# Patient Record
Sex: FEMALE | Race: OTHER | Employment: FULL TIME | URBAN - METROPOLITAN AREA
[De-identification: names, ages, dates, MRNs, and addresses within clinical notes are randomized per-mention and may not be internally consistent; named-entity substitution may affect disease eponyms.]

---

## 2019-11-07 ENCOUNTER — OFFICE VISIT (OUTPATIENT)
Dept: INTERNAL MEDICINE CLINIC | Facility: CLINIC | Age: 49
End: 2019-11-07
Payer: COMMERCIAL

## 2019-11-07 VITALS
HEART RATE: 83 BPM | HEIGHT: 63 IN | OXYGEN SATURATION: 98 % | DIASTOLIC BLOOD PRESSURE: 87 MMHG | SYSTOLIC BLOOD PRESSURE: 145 MMHG | WEIGHT: 153 LBS | BODY MASS INDEX: 27.11 KG/M2

## 2019-11-07 DIAGNOSIS — R03.0 ELEVATED BLOOD PRESSURE READING: ICD-10-CM

## 2019-11-07 DIAGNOSIS — D23.39 DERMOID CYST OF FOREHEAD: ICD-10-CM

## 2019-11-07 DIAGNOSIS — Z12.4 SCREENING FOR CERVICAL CANCER: ICD-10-CM

## 2019-11-07 DIAGNOSIS — R22.1 LUMP IN NECK: ICD-10-CM

## 2019-11-07 DIAGNOSIS — R21 RASH AND NONSPECIFIC SKIN ERUPTION: ICD-10-CM

## 2019-11-07 DIAGNOSIS — Z12.31 BREAST CANCER SCREENING BY MAMMOGRAM: ICD-10-CM

## 2019-11-07 DIAGNOSIS — Z12.11 SCREEN FOR COLON CANCER: Primary | ICD-10-CM

## 2019-11-07 PROCEDURE — 99204 OFFICE O/P NEW MOD 45 MIN: CPT | Performed by: INTERNAL MEDICINE

## 2019-11-07 NOTE — PROGRESS NOTES
Brandi Cazares is a 44year old female. Patient presents with:  Rash: on R-outter thigh  Bump: on forehead  Swelling: R-clavicle    HPI:   57-year-old female here to reestablish care.    She reports that she noticed a bump on her forehead few months davion Constitutional: Negative for activity change, appetite change and fever. HENT: Negative for congestion and voice change. Respiratory: Negative for cough and shortness of breath. Cardiovascular: Negative for chest pain.    Gastrointestinal: Negativ HEAD/NECK (CVY=[id number]); Future    6. Elevated blood pressure reading  Discussed about pharmacotherapy. She wants to wait. Discussed low-salt diet. Encourage patient to check blood pressure at home.     7. Rash and nonspecific skin eruption  Circumscribed

## 2019-11-08 ENCOUNTER — TELEPHONE (OUTPATIENT)
Dept: INTERNAL MEDICINE CLINIC | Facility: CLINIC | Age: 49
End: 2019-11-08

## 2019-11-08 NOTE — TELEPHONE ENCOUNTER
Spoke with Davie Tian at [facility] receipt of Econazole Rx sent today, but reports 85g = 7 tubes of medication. Davie Tian asking if Dr. Vladislav Moses would like Rx for 15 g tube with add'l refills.     Med re-pended for 15 g + 5     Please advise pr

## 2019-11-08 NOTE — TELEPHONE ENCOUNTER
Spoke with patient and relayed Dr. Lazaro Puente message below--patient verbalizes understanding and agreement. No further questions/concerns at this time.

## 2019-11-08 NOTE — TELEPHONE ENCOUNTER
Patient states the pharmacy advised her the medication below is no longer available and requesting an alternative medication. •  Clotrimazole 1 % External Ointment, Apply 1 Application topically 2 (two) times daily. , Disp: 56.7 g, Rfl: 2

## 2019-11-20 ENCOUNTER — OFFICE VISIT (OUTPATIENT)
Dept: OBGYN CLINIC | Facility: CLINIC | Age: 49
End: 2019-11-20
Payer: COMMERCIAL

## 2019-11-20 VITALS
HEART RATE: 87 BPM | DIASTOLIC BLOOD PRESSURE: 83 MMHG | SYSTOLIC BLOOD PRESSURE: 142 MMHG | BODY MASS INDEX: 27 KG/M2 | WEIGHT: 154.63 LBS

## 2019-11-20 DIAGNOSIS — Z01.419 WELL WOMAN EXAM WITH ROUTINE GYNECOLOGICAL EXAM: Primary | ICD-10-CM

## 2019-11-20 PROCEDURE — 99386 PREV VISIT NEW AGE 40-64: CPT | Performed by: CLINICAL NURSE SPECIALIST

## 2019-11-20 NOTE — PROGRESS NOTES
Kameron Wills is a 44year old female [id number] Patient's last menstrual period was 09/13/2019. Patient presents with:  Gyn Exam: ANNUAL EXAM   Last annual exam and pap was 2013. Pap was normal, HPV negative. Denies hx of abnormal pap's.  Periods are ir Years: 15.00        Pack years: 7.5        Types: Cigarettes        Quit date: 9/26/2016        Years since quitting: 3.1      Smokeless tobacco: Never Used      Tobacco comment: 1 pack in a week    Substance and Sexual Activity      Alcohol use:  No Narrative      Not on file      FAMILY HISTORY:  Family History   Problem Relation Age of Onset   • Hypertension Father    • Heart Disease Mother         CAD   • Hypertension Mother        MEDICATIONS:    Current Outpatient Medications:   •  Econazole Nitr situation.  Appropriate mood and affect    Pelvic Exam:  External Genitalia: normal appearance, hair distribution, and no lesions  Urethral Meatus:  normal in size, location, without lesions and prolapse  Bladder:  No fullness, masses or tenderness  Vagina:

## 2019-11-21 ENCOUNTER — OFFICE VISIT (OUTPATIENT)
Dept: SURGERY | Facility: CLINIC | Age: 49
End: 2019-11-21
Payer: COMMERCIAL

## 2019-11-21 VITALS — BODY MASS INDEX: 27.29 KG/M2 | HEIGHT: 63 IN | WEIGHT: 154 LBS

## 2019-11-21 DIAGNOSIS — L72.0 EPIDERMAL CYST: Primary | ICD-10-CM

## 2019-11-21 PROCEDURE — 99242 OFF/OP CONSLTJ NEW/EST SF 20: CPT | Performed by: SURGERY

## 2019-11-21 NOTE — H&P
Surgical Consultation    Chief complaint: Patient presents with:  Cyst: Pt has a lump on her forehead that has been present for a few months. Denies pain. HPI: Delroy Augustin presents for consultation and evaluation of a mass that developed on her forehead. standard drinks      Drug use: No      Sexual activity: Yes        Partners: Male       Family history:  Family History   Problem Relation Age of Onset   • Hypertension Father    • Heart Disease Mother         CAD   • Hypertension Mother         Review of appropriate for age      Assessment and plan:  Diagnoses and all orders for this visit:    Epidermal cyst       The differential diagnosis was discussed, the surgical approaches to excision were discussed, the risks and benefits and alternatives were discu

## 2019-11-27 ENCOUNTER — OFFICE VISIT (OUTPATIENT)
Dept: SURGERY | Facility: CLINIC | Age: 49
End: 2019-11-27
Payer: COMMERCIAL

## 2019-11-27 VITALS — WEIGHT: 154 LBS | BODY MASS INDEX: 27.29 KG/M2 | HEIGHT: 63 IN

## 2019-11-27 DIAGNOSIS — L72.0 EPIDERMAL CYST: Primary | ICD-10-CM

## 2019-11-27 PROCEDURE — 11443 EXC FACE-MM B9+MARG 2.1-3 CM: CPT | Performed by: SURGERY

## 2019-11-27 PROCEDURE — 12051 INTMD RPR FACE/MM 2.5 CM/<: CPT | Performed by: SURGERY

## 2019-11-27 NOTE — PROCEDURES
Procedure Note  The risks, benefits, alternatives and expected recovery were explained. The pt expressed understanding and wished to proceed with the procedure.       Preop:  Enlarging subcutaneous mass of the forehead  Postop:  Same  Procedure: Excision o

## 2019-12-04 ENCOUNTER — HOSPITAL ENCOUNTER (OUTPATIENT)
Dept: ULTRASOUND IMAGING | Age: 49
Discharge: HOME | End: 2019-12-04
Attending: INTERNAL MEDICINE
Payer: COMMERCIAL

## 2019-12-04 DIAGNOSIS — R22.1 LUMP IN NECK: ICD-10-CM

## 2019-12-04 PROCEDURE — 76536 US EXAM OF HEAD AND NECK: CPT | Performed by: INTERNAL MEDICINE

## 2019-12-05 ENCOUNTER — APPOINTMENT (OUTPATIENT)
Dept: LAB | Facility: HOSPITAL | Age: 49
End: 2019-12-05
Attending: INTERNAL MEDICINE
Payer: COMMERCIAL

## 2019-12-05 ENCOUNTER — OFFICE VISIT (OUTPATIENT)
Dept: INTERNAL MEDICINE CLINIC | Facility: CLINIC | Age: 49
End: 2019-12-05
Payer: COMMERCIAL

## 2019-12-05 VITALS
BODY MASS INDEX: 27.29 KG/M2 | SYSTOLIC BLOOD PRESSURE: 126 MMHG | HEIGHT: 63 IN | WEIGHT: 154 LBS | HEART RATE: 66 BPM | DIASTOLIC BLOOD PRESSURE: 85 MMHG

## 2019-12-05 DIAGNOSIS — Z00.00 ADULT GENERAL MEDICAL EXAM: Primary | ICD-10-CM

## 2019-12-05 DIAGNOSIS — Z00.00 ADULT GENERAL MEDICAL EXAM: ICD-10-CM

## 2019-12-05 PROCEDURE — 80053 COMPREHEN METABOLIC PANEL: CPT

## 2019-12-05 PROCEDURE — 80061 LIPID PANEL: CPT

## 2019-12-05 PROCEDURE — 81001 URINALYSIS AUTO W/SCOPE: CPT

## 2019-12-05 PROCEDURE — 36415 COLL VENOUS BLD VENIPUNCTURE: CPT

## 2019-12-05 PROCEDURE — 83036 HEMOGLOBIN GLYCOSYLATED A1C: CPT

## 2019-12-05 PROCEDURE — 99396 PREV VISIT EST AGE 40-64: CPT | Performed by: INTERNAL MEDICINE

## 2019-12-05 PROCEDURE — 85027 COMPLETE CBC AUTOMATED: CPT

## 2019-12-05 PROCEDURE — 84443 ASSAY THYROID STIM HORMONE: CPT

## 2019-12-05 NOTE — PROGRESS NOTES
Emily Moralez is a 44year old female. Patient presents with:  Test Results: had US done, pt fasting for labs    HPI:   49-year-old female here for physical.  She has no complaints. The lump in her forehead has been removed.     Past medical history n reaction(s): METHYLPREDNISOLONE     REVIEW OF SYSTEMS:     Review of Systems   Constitutional: Negative for appetite change, fever and unexpected weight change. HENT: Negative for congestion, ear pain, nosebleeds and sore throat.     Eyes: Negative for pa cranial nerve deficit, sensory deficit or motor deficit. Coordination normal.   Skin: Skin is warm and dry. Psychiatric: She has a normal mood and affect. Her behavior is normal. Judgment normal.         ASSESSMENT AND PLAN:   1.  Adult general medical ex

## 2019-12-06 ENCOUNTER — OFFICE VISIT (OUTPATIENT)
Dept: SURGERY | Facility: CLINIC | Age: 49
End: 2019-12-06
Payer: COMMERCIAL

## 2019-12-06 ENCOUNTER — HOSPITAL ENCOUNTER (OUTPATIENT)
Dept: MAMMOGRAPHY | Age: 49
Discharge: HOME | End: 2019-12-06
Attending: INTERNAL MEDICINE
Payer: COMMERCIAL

## 2019-12-06 VITALS — HEIGHT: 63 IN | WEIGHT: 154 LBS | BODY MASS INDEX: 27.29 KG/M2

## 2019-12-06 DIAGNOSIS — Z98.890 POST-OPERATIVE STATE: Primary | ICD-10-CM

## 2019-12-06 DIAGNOSIS — D17.9 LIPOMA, UNSPECIFIED SITE: Primary | ICD-10-CM

## 2019-12-06 DIAGNOSIS — Z12.31 BREAST CANCER SCREENING BY MAMMOGRAM: ICD-10-CM

## 2019-12-06 PROCEDURE — 99024 POSTOP FOLLOW-UP VISIT: CPT | Performed by: SURGERY

## 2019-12-06 PROCEDURE — 77063 BREAST TOMOSYNTHESIS BI: CPT | Performed by: INTERNAL MEDICINE

## 2019-12-06 PROCEDURE — 77067 SCR MAMMO BI INCL CAD: CPT | Performed by: INTERNAL MEDICINE

## 2019-12-06 NOTE — PROGRESS NOTES
S:  Catarino Tsang is a 44year old female sp excsn hemangioma  Doing well, no fevers, no chills, tolerating a general diet, generally normal bowel movements, no calf tenderness nor lower extremity edema, no shortness of breath, no chest pain.      We

## 2019-12-10 ENCOUNTER — TELEPHONE (OUTPATIENT)
Dept: INTERNAL MEDICINE CLINIC | Facility: CLINIC | Age: 49
End: 2019-12-10

## 2019-12-10 NOTE — TELEPHONE ENCOUNTER
Patient calling, received call yesterday about mammogram results, states she is bit confused with the outcome of that call ( see result notes )   Patient informed she needs to have an US of pascual breasts and should have been contacted by Mammogram dept.    Pa

## 2019-12-26 ENCOUNTER — HOSPITAL ENCOUNTER (OUTPATIENT)
Dept: ULTRASOUND IMAGING | Facility: HOSPITAL | Age: 49
Discharge: HOME | End: 2019-12-26
Attending: INTERNAL MEDICINE
Payer: COMMERCIAL

## 2019-12-26 DIAGNOSIS — R92.8 ABNORMAL MAMMOGRAM: ICD-10-CM

## 2019-12-26 PROCEDURE — 76642 ULTRASOUND BREAST LIMITED: CPT | Performed by: INTERNAL MEDICINE

## 2020-07-13 ENCOUNTER — OFFICE VISIT (OUTPATIENT)
Dept: INTERNAL MEDICINE CLINIC | Facility: CLINIC | Age: 50
End: 2020-07-13
Payer: COMMERCIAL

## 2020-07-13 VITALS
TEMPERATURE: 99 F | SYSTOLIC BLOOD PRESSURE: 137 MMHG | BODY MASS INDEX: 28 KG/M2 | HEIGHT: 63 IN | HEART RATE: 77 BPM | DIASTOLIC BLOOD PRESSURE: 88 MMHG | WEIGHT: 158 LBS

## 2020-07-13 DIAGNOSIS — R04.0 EPISTAXIS: Primary | ICD-10-CM

## 2020-07-13 DIAGNOSIS — Z12.11 SCREEN FOR COLON CANCER: ICD-10-CM

## 2020-07-13 DIAGNOSIS — D17.0 LIPOMA OF NECK: ICD-10-CM

## 2020-07-13 DIAGNOSIS — H53.8 BLURRY VISION, BILATERAL: ICD-10-CM

## 2020-07-13 PROCEDURE — 99214 OFFICE O/P EST MOD 30 MIN: CPT | Performed by: INTERNAL MEDICINE

## 2020-07-14 NOTE — PROGRESS NOTES
Loco Orellana is a 44year old female. Patient presents with:  Bump: on forehead, was told it was a lipoma  Epistaxis: after hot shower or when bending down, bleeds from R-nostril    HPI:   53-year-old female here for follow-up.   She reports that for a week    Alcohol use: No      Alcohol/week: 0.0 standard drinks    Drug use: No     Family History   Problem Relation Age of Onset   • Hypertension Father    • Heart Disease Mother         CAD   • Hypertension Mother         Methylprednisolone          Co INTERNAL    2. Lipoma of neck  I have reviewed the ultrasound neck report. Provided with a referral for surgery. She also needs reevaluation of previous excision site/recurrence  - SURGERY - INTERNAL    3.  Blurry vision, bilateral  We will refer ophthalm

## 2020-07-22 ENCOUNTER — TELEPHONE (OUTPATIENT)
Dept: OPHTHALMOLOGY | Facility: CLINIC | Age: 50
End: 2020-07-22

## 2020-07-28 ENCOUNTER — LAB ENCOUNTER (OUTPATIENT)
Dept: LAB | Facility: HOSPITAL | Age: 50
End: 2020-07-28
Attending: INTERNAL MEDICINE
Payer: COMMERCIAL

## 2020-07-28 ENCOUNTER — OFFICE VISIT (OUTPATIENT)
Dept: OTOLARYNGOLOGY | Facility: CLINIC | Age: 50
End: 2020-07-28
Payer: COMMERCIAL

## 2020-07-28 VITALS
SYSTOLIC BLOOD PRESSURE: 136 MMHG | HEIGHT: 63 IN | DIASTOLIC BLOOD PRESSURE: 85 MMHG | HEART RATE: 81 BPM | WEIGHT: 158 LBS | BODY MASS INDEX: 28 KG/M2

## 2020-07-28 DIAGNOSIS — Z00.00 ADULT GENERAL MEDICAL EXAM: ICD-10-CM

## 2020-07-28 DIAGNOSIS — R04.0 EPISTAXIS: Primary | ICD-10-CM

## 2020-07-28 LAB
BACTERIA UR QL AUTO: NEGATIVE /HPF
BILIRUB UR QL: NEGATIVE
COLOR UR: YELLOW
GLUCOSE UR-MCNC: NEGATIVE MG/DL
KETONES UR-MCNC: NEGATIVE MG/DL
LEUKOCYTE ESTERASE UR QL STRIP.AUTO: NEGATIVE
NITRITE UR QL STRIP.AUTO: NEGATIVE
PH UR: 6 [PH] (ref 5–8)
PROT UR-MCNC: NEGATIVE MG/DL
RBC #/AREA URNS AUTO: 3 /HPF
SP GR UR STRIP: 1.03 (ref 1–1.03)
UROBILINOGEN UR STRIP-ACNC: 2
WBC #/AREA URNS AUTO: 1 /HPF

## 2020-07-28 PROCEDURE — 3008F BODY MASS INDEX DOCD: CPT | Performed by: OTOLARYNGOLOGY

## 2020-07-28 PROCEDURE — 99212 OFFICE O/P EST SF 10 MIN: CPT | Performed by: OTOLARYNGOLOGY

## 2020-07-28 PROCEDURE — 3079F DIAST BP 80-89 MM HG: CPT | Performed by: OTOLARYNGOLOGY

## 2020-07-28 PROCEDURE — 81001 URINALYSIS AUTO W/SCOPE: CPT

## 2020-07-28 PROCEDURE — 99203 OFFICE O/P NEW LOW 30 MIN: CPT | Performed by: OTOLARYNGOLOGY

## 2020-07-28 PROCEDURE — 3075F SYST BP GE 130 - 139MM HG: CPT | Performed by: OTOLARYNGOLOGY

## 2020-07-29 ENCOUNTER — TELEPHONE (OUTPATIENT)
Dept: INTERNAL MEDICINE CLINIC | Facility: CLINIC | Age: 50
End: 2020-07-29

## 2020-07-29 NOTE — TELEPHONE ENCOUNTER
Pt is due for annual exam  On 12/2020, will be needing order for colonoscopy or fit test, can this wait until December or does it have to be done now?

## 2020-07-29 NOTE — PROGRESS NOTES
Candis Souza is a 44year old female. Patient presents with:  Nose Bleed: right side, per pt has had 1 nose bleed in last two weeks     HPI:   She has been experiencing some crusting in her nose only on the right side.   This is been going on for the Nasal Normal External nose - Normal. Nasal septum -nasal septal deviation with crusting along the nasal septum anteriorly   Neurological Normal Memory - Normal. Cranial nerves - Cranial nerves II through XII grossly intact.    Neck Exam Normal Inspection

## 2020-07-29 NOTE — PROGRESS NOTES
I have reviewed her urine test.  She still has tiny bit of blood in the urine. In light of persistence of blood in the urine, I have given her a referral for urology. Please provide patient with a phone number and the name.   I have placed a referral in t

## 2020-07-30 ENCOUNTER — OFFICE VISIT (OUTPATIENT)
Dept: SURGERY | Facility: CLINIC | Age: 50
End: 2020-07-30
Payer: COMMERCIAL

## 2020-07-30 VITALS — HEIGHT: 63 IN | WEIGHT: 158 LBS | BODY MASS INDEX: 28 KG/M2

## 2020-07-30 DIAGNOSIS — Z98.890 POST-OPERATIVE STATE: Primary | ICD-10-CM

## 2020-07-30 PROCEDURE — 3008F BODY MASS INDEX DOCD: CPT | Performed by: SURGERY

## 2020-07-30 PROCEDURE — 99212 OFFICE O/P EST SF 10 MIN: CPT | Performed by: SURGERY

## 2020-07-31 ENCOUNTER — TELEPHONE (OUTPATIENT)
Dept: INTERNAL MEDICINE CLINIC | Facility: CLINIC | Age: 50
End: 2020-07-31

## 2020-07-31 DIAGNOSIS — D17.79 LIPOMA OF OTHER SPECIFIED SITES: Primary | ICD-10-CM

## 2020-07-31 NOTE — TELEPHONE ENCOUNTER
Pt needs referral to see surgeon Dr. Stu Kapadia for removal of painful lipoma on forehead and neck, was referred by Dr. Allison Davis.

## 2020-08-03 NOTE — TELEPHONE ENCOUNTER
Having difficulty locating this doctor.   Please pend me the doctors referral.  Diagnosis lipoma thank you

## 2020-08-04 ENCOUNTER — OFFICE VISIT (OUTPATIENT)
Dept: SURGERY | Facility: CLINIC | Age: 50
End: 2020-08-04
Payer: COMMERCIAL

## 2020-08-04 VITALS
SYSTOLIC BLOOD PRESSURE: 123 MMHG | BODY MASS INDEX: 28 KG/M2 | HEIGHT: 63 IN | HEART RATE: 68 BPM | WEIGHT: 158 LBS | DIASTOLIC BLOOD PRESSURE: 83 MMHG

## 2020-08-04 DIAGNOSIS — R31.29 MICROHEMATURIA: Primary | ICD-10-CM

## 2020-08-04 LAB
BACTERIA UR QL AUTO: NEGATIVE /HPF
RBC #/AREA URNS AUTO: 1 /HPF
WBC #/AREA URNS AUTO: 1 /HPF

## 2020-08-04 PROCEDURE — 99203 OFFICE O/P NEW LOW 30 MIN: CPT | Performed by: NURSE PRACTITIONER

## 2020-08-04 PROCEDURE — 99212 OFFICE O/P EST SF 10 MIN: CPT | Performed by: NURSE PRACTITIONER

## 2020-08-04 PROCEDURE — 3008F BODY MASS INDEX DOCD: CPT | Performed by: NURSE PRACTITIONER

## 2020-08-04 PROCEDURE — 3074F SYST BP LT 130 MM HG: CPT | Performed by: NURSE PRACTITIONER

## 2020-08-04 PROCEDURE — 3079F DIAST BP 80-89 MM HG: CPT | Performed by: NURSE PRACTITIONER

## 2020-08-04 NOTE — PROGRESS NOTES
HPI:    Patient ID: Leslie Beard is a 44year old female. HPI     Patient is a 44year old female who presents to the clinic for a consult regarding microhematuria. No significant past medical history.       Patient had UA done by pcp  7/28/20 wh is no tenderness. Genitourinary:    Genitourinary Comments: No CVA tenderness     Musculoskeletal: Normal range of motion. Neurological: She is alert and oriented to person, place, and time. Skin: Skin is warm and dry.    Psychiatric: She has a normal

## 2020-08-05 ENCOUNTER — OFFICE VISIT (OUTPATIENT)
Dept: OPHTHALMOLOGY | Facility: CLINIC | Age: 50
End: 2020-08-05
Payer: COMMERCIAL

## 2020-08-05 DIAGNOSIS — H52.203 HYPEROPIA OF BOTH EYES WITH ASTIGMATISM AND PRESBYOPIA: ICD-10-CM

## 2020-08-05 DIAGNOSIS — H52.03 HYPEROPIA OF BOTH EYES WITH ASTIGMATISM AND PRESBYOPIA: ICD-10-CM

## 2020-08-05 DIAGNOSIS — H52.4 HYPEROPIA OF BOTH EYES WITH ASTIGMATISM AND PRESBYOPIA: ICD-10-CM

## 2020-08-05 DIAGNOSIS — H25.13 AGE-RELATED NUCLEAR CATARACT OF BOTH EYES: Primary | ICD-10-CM

## 2020-08-05 PROCEDURE — 99212 OFFICE O/P EST SF 10 MIN: CPT | Performed by: OPHTHALMOLOGY

## 2020-08-05 PROCEDURE — 92015 DETERMINE REFRACTIVE STATE: CPT | Performed by: OPHTHALMOLOGY

## 2020-08-05 PROCEDURE — 99203 OFFICE O/P NEW LOW 30 MIN: CPT | Performed by: OPHTHALMOLOGY

## 2020-08-05 NOTE — PATIENT INSTRUCTIONS
Hyperopia of both eyes with astigmatism and presbyopia  Recommend bifocals for best corrected vision and distance and near, but patient requests separate distance and reading glasses.      Age-related nuclear cataract of both eyes  Discussed mild cataracts may have. Your eye healthcare provider will also ask about health problems, such as diabetes. Be sure to tell your healthcare provider about any recent health conditions and all medicines, vitamins, herbs, or other supplements you are taking.    Vision test your healthcare professional's instructions. What Is Presbyopia? Presbyopia is the loss of close-up focusing. It is not a disease. It is the normal aging process of the eye.  When you are younger, the lens in your eye changes shape to focus light di

## 2020-08-05 NOTE — ASSESSMENT & PLAN NOTE
Recommend bifocals for best corrected vision and distance and near, but patient requests separate distance and reading glasses.

## 2020-08-05 NOTE — PROGRESS NOTES
Fran Caba is a 44year old female. HPI:     HPI     Consult      Additional comments: Consult per Dr. Dylan Iyer patient here for a complete exam per Dr. Toan Verdin.  Patient complains of blurry vision when reading.   He Endocrine, Cardiovascular, Respiratory, Psychiatric, Allergic/Imm, Heme/Lymph    Last edited by Zuleyka Julian OT on 8/5/2020  2:52 PM. (History)          PHYSICAL EXAM:     Base Eye Exam     Visual Acuity (Snellen - Linear)       Right Left    Dist sc 20 and Plan:     Hyperopia of both eyes with astigmatism and presbyopia  Recommend bifocals for best corrected vision and distance and near, but patient requests separate distance and reading glasses.      Age-related nuclear cataract of both eyes  Discussed m

## 2020-08-26 ENCOUNTER — TELEPHONE (OUTPATIENT)
Dept: GASTROENTEROLOGY | Facility: CLINIC | Age: 50
End: 2020-08-26

## 2020-08-31 NOTE — PROGRESS NOTES
Surgical Consultation    Chief complaint: Patient presents with:  Cyst: Patient here today for cyst, forehead, and R neck.       HPI: Dian Garcia presents for re-evaluation of a previously excised forehead, she is concerned it is recurring and is not pleased wit Hypertension Father    • Heart Disease Mother         CAD   • Hypertension Mother    • Diabetes Neg    • Glaucoma Neg    • Macular degeneration Neg         Review of Systems:   GENERAL: feels generally well  SKIN: no ulcerated or worrisome skin lesions  EY

## 2020-09-09 ENCOUNTER — OFFICE VISIT (OUTPATIENT)
Dept: SURGERY | Facility: CLINIC | Age: 50
End: 2020-09-09
Payer: COMMERCIAL

## 2020-09-09 DIAGNOSIS — D18.01 HEMANGIOMA OF SUBCUTANEOUS TISSUE: Primary | ICD-10-CM

## 2020-09-09 DIAGNOSIS — D17.0 LIPOMA OF NECK: ICD-10-CM

## 2020-09-09 PROBLEM — D18.00 HEMANGIOMA: Status: ACTIVE | Noted: 2020-09-09

## 2020-09-09 PROBLEM — D17.9 LIPOMA: Status: ACTIVE | Noted: 2020-09-09

## 2020-09-09 PROCEDURE — 99203 OFFICE O/P NEW LOW 30 MIN: CPT | Performed by: SURGERY

## 2020-09-09 NOTE — PATIENT INSTRUCTIONS
Surgeon:              Dr. Kim Sage.  Ismael Geronimo, PhD                                          Tel:         [phone]                                  Fax:        [phone]    Surgery/Procedure:     Excision of forehead deep hemangioma/soft tissue mass, excis

## 2020-09-09 NOTE — CONSULTS
New Patient Consultation    Chief Complaint: lipoma neck R , forehead hemangioma subcut    History of Present Illness:   Jomar English is a 44year old female who presents with a L neck lipoma, she reports pain in her arm and the lipoma growing in si fatigue, generalized weakness, change in appetite, weight loss, or weight gain. Endocrine:    There is no history of poor/slow wound healing, weight loss/gain, fertility or hormone problems, cold intolerance, heat intolerance, excessive thirst, or thyroid history of +migraines or severe headaches, seizure/epilepsy, speech problems, coordination problems, trembling/tremors, fainting/black outs, dizziness, memory problems, loss of sensation/numbness, problems walking, weakness, tingling or burning in hands/fe procedures and the postoperative care was discussed in detail. Potential risks complications benefits and alternatives were discussed.   Risks and complications including but not limited to infection, bleeding, scarring, damage to surrounding structures, s

## 2020-09-15 ENCOUNTER — TELEPHONE (OUTPATIENT)
Dept: SURGERY | Facility: CLINIC | Age: 50
End: 2020-09-15

## 2020-09-15 DIAGNOSIS — D18.01 HEMANGIOMA OF SKIN AND SUBCUTANEOUS TISSUE: Primary | ICD-10-CM

## 2020-09-15 DIAGNOSIS — D17.0 LIPOMA OF SKIN AND SUBCUTANEOUS TISSUE OF FACE: ICD-10-CM

## 2020-09-15 NOTE — TELEPHONE ENCOUNTER
I called the patient and scheduled her procedure with Dr Duane Sage on 11/16/20 at [facility].  Confirmed date and location

## 2020-09-30 ENCOUNTER — TELEPHONE (OUTPATIENT)
Dept: OPHTHALMOLOGY | Facility: CLINIC | Age: 50
End: 2020-09-30

## 2020-09-30 ENCOUNTER — TELEPHONE (OUTPATIENT)
Dept: INTERNAL MEDICINE CLINIC | Facility: CLINIC | Age: 50
End: 2020-09-30

## 2020-09-30 ENCOUNTER — NURSE ONLY (OUTPATIENT)
Dept: GASTROENTEROLOGY | Facility: CLINIC | Age: 50
End: 2020-09-30

## 2020-09-30 NOTE — TELEPHONE ENCOUNTER
In My Chart to find your glasses or contact prescription, log on and find tab that says \"Health\". Under \"Health\" there are 3 categories. 1st category is \"What's in my Record,\"  2nd category is Medical tools, 3rd category is [facility].   Go to

## 2020-09-30 NOTE — PROGRESS NOTES
History, allergies, and medications reviewed.   Please advise on orders and prep:      Last Procedure, Date, MD:  11/21/2013, Dr. Macy Rosas, colonoscopy  Last Diagnosis:  Colon polyp, hemorrhoids  Recalled for (mth/yrs): 5 years  Sedation used previously:MAC of the colon up to the cecum and into the terminal ileum. The cecum was confirmed by landmarks including appendiceal orifice, cecal strap, and ileocecal valve. The quality of the prep was good. Retroflexion was performed in the rectum.   COLONOSCOPY FINDING

## 2020-09-30 NOTE — TELEPHONE ENCOUNTER
Per pt, she has changed her mind on getting two glasses and is only wanting one pair, if possible to combine long distance and reading.   Please advise

## 2020-10-01 RX ORDER — POLYETHYLENE GLYCOL 3350, SODIUM CHLORIDE, POTASSIUM CHLORIDE, SODIUM BICARBONATE, AND SODIUM SULFATE 240; 5.84; 2.98; 6.72; 22.72 G/4L; G/4L; G/4L; G/4L; G/4L
POWDER, FOR SOLUTION ORAL
Qty: 1 BOTTLE | Refills: 0 | Status: SHIPPED | OUTPATIENT
Start: 2020-10-01 | End: 2020-11-13

## 2020-10-01 NOTE — PROGRESS NOTES
Thanks Finesse Wang. Recent office visit with Dr. Deysi Dorado reviewed. She is a smoker.     GI schedulers –    Please schedule colonoscopy exam at [facility] (Jasmeet Packer)    BMI Readings from Last 1 Encounters:  08/04/20 : 27.99 kg/m²

## 2020-10-20 ENCOUNTER — TELEPHONE (OUTPATIENT)
Dept: INTERNAL MEDICINE CLINIC | Facility: CLINIC | Age: 50
End: 2020-10-20

## 2020-10-20 ENCOUNTER — TELEPHONE (OUTPATIENT)
Dept: SURGERY | Facility: CLINIC | Age: 50
End: 2020-10-20

## 2020-10-20 DIAGNOSIS — D17.0 LIPOMA OF NECK: Primary | ICD-10-CM

## 2020-10-20 NOTE — TELEPHONE ENCOUNTER
[address] states patient is having a tumor excision procedure done 11/16/2020 and is requesting referral for Dr. Marylee Larry. Lexus Henriquez states referral needs to be back dated to 09/09/2020.

## 2020-10-20 NOTE — TELEPHONE ENCOUNTER
Called patient to let her know that I received a letter stating that her surgery was denied, and that I called and spoke to a  at [facility] to clarify, as I was told  and [facility] are both in network.  I let her know that I found out because

## 2020-10-23 ENCOUNTER — OFFICE VISIT (OUTPATIENT)
Dept: INTERNAL MEDICINE CLINIC | Facility: CLINIC | Age: 50
End: 2020-10-23
Payer: COMMERCIAL

## 2020-10-23 VITALS
BODY MASS INDEX: 29 KG/M2 | SYSTOLIC BLOOD PRESSURE: 134 MMHG | HEART RATE: 62 BPM | OXYGEN SATURATION: 99 % | DIASTOLIC BLOOD PRESSURE: 82 MMHG | WEIGHT: 161 LBS | TEMPERATURE: 99 F

## 2020-10-23 DIAGNOSIS — Z12.11 SCREEN FOR COLON CANCER: ICD-10-CM

## 2020-10-23 DIAGNOSIS — Z00.00 ADULT GENERAL MEDICAL EXAM: ICD-10-CM

## 2020-10-23 DIAGNOSIS — Z01.818 PREOPERATIVE CLEARANCE: Primary | ICD-10-CM

## 2020-10-23 DIAGNOSIS — Z12.31 BREAST CANCER SCREENING BY MAMMOGRAM: ICD-10-CM

## 2020-10-23 PROCEDURE — 99214 OFFICE O/P EST MOD 30 MIN: CPT | Performed by: INTERNAL MEDICINE

## 2020-10-23 PROCEDURE — 3075F SYST BP GE 130 - 139MM HG: CPT | Performed by: INTERNAL MEDICINE

## 2020-10-23 PROCEDURE — 3079F DIAST BP 80-89 MM HG: CPT | Performed by: INTERNAL MEDICINE

## 2020-10-23 NOTE — PROGRESS NOTES
Tia Up is a 44year old female. Patient presents with:  Pre-Op Exam    HPI:   47-year-old pleasant lady with no significant medical history here for a stratification of excision of forehead mass and a neck lipoma. She has no complaints.   She r Neg    • Macular degeneration Neg         Methylprednisolone          Comment:Other reaction(s): METHYLPREDNISOLONE     REVIEW OF SYSTEMS:     Review of Systems   Constitutional: Negative for appetite change, fever and unexpected weight change.    HENT: Neg sounds are normal. There is no abdominal tenderness. Neurological: She is alert and oriented to person, place, and time. No cranial nerve deficit. Coordination normal.   Skin: Skin is warm and dry. Psychiatric: She has a normal mood and affect.  Her beh

## 2020-10-28 ENCOUNTER — TELEPHONE (OUTPATIENT)
Dept: SURGERY | Facility: CLINIC | Age: 50
End: 2020-10-28

## 2020-10-28 DIAGNOSIS — D18.01 HEMANGIOMA OF SUBCUTANEOUS TISSUE: Primary | ICD-10-CM

## 2020-10-28 DIAGNOSIS — D17.0 LIPOMA OF NECK: ICD-10-CM

## 2020-10-28 NOTE — TELEPHONE ENCOUNTER
Called pt to update her regarding the authorization for her procedure with Dr. Summer Duarte. Let pt know that the referral was put in from her pcp to see Dr. Summer Duarte however they were not able to back date to 9/9/2020.   Now that the referral is on file with [facility] I w

## 2020-10-29 NOTE — PROGRESS NOTES
Please call patient and instruct her to do the blood testing for preoperative clearance. Also please encourage her to submit the stool sample.   Thank you

## 2020-10-30 ENCOUNTER — APPOINTMENT (OUTPATIENT)
Dept: LAB | Age: 50
End: 2020-10-30
Attending: INTERNAL MEDICINE
Payer: COMMERCIAL

## 2020-10-30 DIAGNOSIS — D18.01 HEMANGIOMA OF SUBCUTANEOUS TISSUE: Primary | ICD-10-CM

## 2020-10-30 PROCEDURE — 85027 COMPLETE CBC AUTOMATED: CPT | Performed by: INTERNAL MEDICINE

## 2020-10-30 PROCEDURE — 80061 LIPID PANEL: CPT | Performed by: INTERNAL MEDICINE

## 2020-10-30 PROCEDURE — 84443 ASSAY THYROID STIM HORMONE: CPT | Performed by: INTERNAL MEDICINE

## 2020-10-30 PROCEDURE — 80053 COMPREHEN METABOLIC PANEL: CPT | Performed by: INTERNAL MEDICINE

## 2020-10-30 PROCEDURE — 36415 COLL VENOUS BLD VENIPUNCTURE: CPT | Performed by: INTERNAL MEDICINE

## 2020-11-01 ENCOUNTER — APPOINTMENT (OUTPATIENT)
Dept: LAB | Age: 50
End: 2020-11-01
Attending: INTERNAL MEDICINE
Payer: COMMERCIAL

## 2020-11-01 PROCEDURE — 82274 ASSAY TEST FOR BLOOD FECAL: CPT | Performed by: INTERNAL MEDICINE

## 2020-11-03 ENCOUNTER — TELEPHONE (OUTPATIENT)
Dept: SURGERY | Facility: CLINIC | Age: 50
End: 2020-11-03

## 2020-11-03 ENCOUNTER — TELEPHONE (OUTPATIENT)
Dept: INTERNAL MEDICINE CLINIC | Facility: CLINIC | Age: 50
End: 2020-11-03

## 2020-11-03 DIAGNOSIS — Z01.818 PREOPERATIVE CLEARANCE: Primary | ICD-10-CM

## 2020-11-03 NOTE — TELEPHONE ENCOUNTER
Called patient to inform her I received her medical clearance. She did not receive her EKG. She stated she will get that done before her upcoming surgery. A letter was sent to PCP stating we still need EKG.

## 2020-11-06 ENCOUNTER — EKG ENCOUNTER (OUTPATIENT)
Dept: LAB | Age: 50
End: 2020-11-06
Attending: INTERNAL MEDICINE
Payer: COMMERCIAL

## 2020-11-06 DIAGNOSIS — Z01.818 PREOPERATIVE CLEARANCE: ICD-10-CM

## 2020-11-06 PROCEDURE — 93005 ELECTROCARDIOGRAM TRACING: CPT

## 2020-11-06 PROCEDURE — 93010 ELECTROCARDIOGRAM REPORT: CPT | Performed by: INTERNAL MEDICINE

## 2020-11-09 ENCOUNTER — TELEPHONE (OUTPATIENT)
Dept: SURGERY | Facility: CLINIC | Age: 50
End: 2020-11-09

## 2020-11-09 NOTE — TELEPHONE ENCOUNTER
Called patient to let her know that I just received the approval for her procedure with Dr. Anabella Ross next week (9/16/2020). Pt verbalized understanding and was appreciative of the call.

## 2020-11-13 ENCOUNTER — APPOINTMENT (OUTPATIENT)
Dept: LAB | Facility: HOSPITAL | Age: 50
End: 2020-11-13
Attending: SURGERY
Payer: COMMERCIAL

## 2020-11-13 DIAGNOSIS — Z01.818 PREOP TESTING: ICD-10-CM

## 2020-11-16 ENCOUNTER — ANESTHESIA (OUTPATIENT)
Dept: SURGERY | Facility: HOSPITAL | Age: 50
End: 2020-11-16
Payer: COMMERCIAL

## 2020-11-16 ENCOUNTER — ANESTHESIA EVENT (OUTPATIENT)
Dept: SURGERY | Facility: HOSPITAL | Age: 50
End: 2020-11-16
Payer: COMMERCIAL

## 2020-11-16 ENCOUNTER — HOSPITAL ENCOUNTER (OUTPATIENT)
Facility: HOSPITAL | Age: 50
Setting detail: HOSPITAL OUTPATIENT SURGERY
Discharge: HOME | End: 2020-11-16
Attending: SURGERY | Admitting: SURGERY
Payer: COMMERCIAL

## 2020-11-16 VITALS
SYSTOLIC BLOOD PRESSURE: 121 MMHG | TEMPERATURE: 97 F | RESPIRATION RATE: 16 BRPM | OXYGEN SATURATION: 99 % | WEIGHT: 157 LBS | BODY MASS INDEX: 27.82 KG/M2 | HEART RATE: 63 BPM | DIASTOLIC BLOOD PRESSURE: 70 MMHG | HEIGHT: 63 IN

## 2020-11-16 DIAGNOSIS — D17.0 LIPOMA OF NECK: ICD-10-CM

## 2020-11-16 DIAGNOSIS — Z01.818 PREOP TESTING: Primary | ICD-10-CM

## 2020-11-16 DIAGNOSIS — D18.01 HEMANGIOMA OF SUBCUTANEOUS TISSUE: ICD-10-CM

## 2020-11-16 PROCEDURE — 0HB1XZZ EXCISION OF FACE SKIN, EXTERNAL APPROACH: ICD-10-PCS | Performed by: SURGERY

## 2020-11-16 PROCEDURE — 81025 URINE PREGNANCY TEST: CPT

## 2020-11-16 PROCEDURE — 0KB10ZZ EXCISION OF FACIAL MUSCLE, OPEN APPROACH: ICD-10-PCS | Performed by: SURGERY

## 2020-11-16 PROCEDURE — 95860 NEEDLE EMG 1 EXTREMITY: CPT | Performed by: SURGERY

## 2020-11-16 PROCEDURE — 88304 TISSUE EXAM BY PATHOLOGIST: CPT | Performed by: SURGERY

## 2020-11-16 PROCEDURE — 0JB40ZZ EXCISION OF RIGHT NECK SUBCUTANEOUS TISSUE AND FASCIA, OPEN APPROACH: ICD-10-PCS | Performed by: SURGERY

## 2020-11-16 PROCEDURE — 88305 TISSUE EXAM BY PATHOLOGIST: CPT | Performed by: SURGERY

## 2020-11-16 RX ORDER — ACETAMINOPHEN 500 MG
1000 TABLET ORAL ONCE
Status: COMPLETED | OUTPATIENT
Start: 2020-11-16 | End: 2020-11-16

## 2020-11-16 RX ORDER — HYDROCODONE BITARTRATE AND ACETAMINOPHEN 5; 325 MG/1; MG/1
2 TABLET ORAL AS NEEDED
Status: DISCONTINUED | OUTPATIENT
Start: 2020-11-16 | End: 2020-11-16

## 2020-11-16 RX ORDER — HYDROCODONE BITARTRATE AND ACETAMINOPHEN 5; 325 MG/1; MG/1
1-2 TABLET ORAL EVERY 4 HOURS PRN
Qty: 20 TABLET | Refills: 0 | Status: SHIPPED | OUTPATIENT
Start: 2020-11-16 | End: 2020-12-04 | Stop reason: ALTCHOICE

## 2020-11-16 RX ORDER — HYDROMORPHONE HYDROCHLORIDE 1 MG/ML
0.2 INJECTION, SOLUTION INTRAMUSCULAR; INTRAVENOUS; SUBCUTANEOUS EVERY 5 MIN PRN
Status: DISCONTINUED | OUTPATIENT
Start: 2020-11-16 | End: 2020-11-16

## 2020-11-16 RX ORDER — HALOPERIDOL 5 MG/ML
0.25 INJECTION INTRAMUSCULAR ONCE AS NEEDED
Status: DISCONTINUED | OUTPATIENT
Start: 2020-11-16 | End: 2020-11-16

## 2020-11-16 RX ORDER — ONDANSETRON 2 MG/ML
INJECTION INTRAMUSCULAR; INTRAVENOUS AS NEEDED
Status: DISCONTINUED | OUTPATIENT
Start: 2020-11-16 | End: 2020-11-16 | Stop reason: SURG

## 2020-11-16 RX ORDER — MORPHINE SULFATE 4 MG/ML
2 INJECTION, SOLUTION INTRAMUSCULAR; INTRAVENOUS EVERY 10 MIN PRN
Status: DISCONTINUED | OUTPATIENT
Start: 2020-11-16 | End: 2020-11-16

## 2020-11-16 RX ORDER — SODIUM CHLORIDE, SODIUM LACTATE, POTASSIUM CHLORIDE, CALCIUM CHLORIDE 600; 310; 30; 20 MG/100ML; MG/100ML; MG/100ML; MG/100ML
INJECTION, SOLUTION INTRAVENOUS CONTINUOUS
Status: DISCONTINUED | OUTPATIENT
Start: 2020-11-16 | End: 2020-11-16

## 2020-11-16 RX ORDER — NALOXONE HYDROCHLORIDE 0.4 MG/ML
80 INJECTION, SOLUTION INTRAMUSCULAR; INTRAVENOUS; SUBCUTANEOUS AS NEEDED
Status: DISCONTINUED | OUTPATIENT
Start: 2020-11-16 | End: 2020-11-16

## 2020-11-16 RX ORDER — HYDROMORPHONE HYDROCHLORIDE 1 MG/ML
0.6 INJECTION, SOLUTION INTRAMUSCULAR; INTRAVENOUS; SUBCUTANEOUS EVERY 5 MIN PRN
Status: DISCONTINUED | OUTPATIENT
Start: 2020-11-16 | End: 2020-11-16

## 2020-11-16 RX ORDER — HYDROCODONE BITARTRATE AND ACETAMINOPHEN 5; 325 MG/1; MG/1
1 TABLET ORAL AS NEEDED
Status: DISCONTINUED | OUTPATIENT
Start: 2020-11-16 | End: 2020-11-16

## 2020-11-16 RX ORDER — ONDANSETRON 2 MG/ML
4 INJECTION INTRAMUSCULAR; INTRAVENOUS ONCE AS NEEDED
Status: DISCONTINUED | OUTPATIENT
Start: 2020-11-16 | End: 2020-11-16

## 2020-11-16 RX ORDER — MORPHINE SULFATE 10 MG/ML
6 INJECTION, SOLUTION INTRAMUSCULAR; INTRAVENOUS EVERY 10 MIN PRN
Status: DISCONTINUED | OUTPATIENT
Start: 2020-11-16 | End: 2020-11-16

## 2020-11-16 RX ORDER — HYDROMORPHONE HYDROCHLORIDE 1 MG/ML
0.4 INJECTION, SOLUTION INTRAMUSCULAR; INTRAVENOUS; SUBCUTANEOUS EVERY 5 MIN PRN
Status: DISCONTINUED | OUTPATIENT
Start: 2020-11-16 | End: 2020-11-16

## 2020-11-16 RX ORDER — PROCHLORPERAZINE EDISYLATE 5 MG/ML
5 INJECTION INTRAMUSCULAR; INTRAVENOUS ONCE AS NEEDED
Status: DISCONTINUED | OUTPATIENT
Start: 2020-11-16 | End: 2020-11-16

## 2020-11-16 RX ORDER — CEFAZOLIN SODIUM/WATER 2 G/20 ML
2 SYRINGE (ML) INTRAVENOUS ONCE
Status: COMPLETED | OUTPATIENT
Start: 2020-11-16 | End: 2020-11-16

## 2020-11-16 RX ORDER — MORPHINE SULFATE 4 MG/ML
4 INJECTION, SOLUTION INTRAMUSCULAR; INTRAVENOUS EVERY 10 MIN PRN
Status: DISCONTINUED | OUTPATIENT
Start: 2020-11-16 | End: 2020-11-16

## 2020-11-16 RX ORDER — MIDAZOLAM HYDROCHLORIDE 1 MG/ML
INJECTION INTRAMUSCULAR; INTRAVENOUS AS NEEDED
Status: DISCONTINUED | OUTPATIENT
Start: 2020-11-16 | End: 2020-11-16 | Stop reason: SURG

## 2020-11-16 RX ORDER — LIDOCAINE HYDROCHLORIDE AND EPINEPHRINE 10; 10 MG/ML; UG/ML
INJECTION, SOLUTION INFILTRATION; PERINEURAL AS NEEDED
Status: DISCONTINUED | OUTPATIENT
Start: 2020-11-16 | End: 2020-11-16 | Stop reason: HOSPADM

## 2020-11-16 RX ORDER — EPHEDRINE SULFATE 50 MG/ML
INJECTION, SOLUTION INTRAVENOUS AS NEEDED
Status: DISCONTINUED | OUTPATIENT
Start: 2020-11-16 | End: 2020-11-16 | Stop reason: SURG

## 2020-11-16 RX ORDER — ONDANSETRON 4 MG/1
4 TABLET, FILM COATED ORAL EVERY 8 HOURS PRN
Qty: 10 TABLET | Refills: 0 | Status: SHIPPED | OUTPATIENT
Start: 2020-11-16 | End: 2020-12-04 | Stop reason: ALTCHOICE

## 2020-11-16 RX ORDER — DOCUSATE SODIUM 100 MG/1
100 CAPSULE, LIQUID FILLED ORAL 2 TIMES DAILY
Qty: 40 CAPSULE | Refills: 0 | Status: SHIPPED | OUTPATIENT
Start: 2020-11-16 | End: 2020-12-04 | Stop reason: ALTCHOICE

## 2020-11-16 RX ORDER — LIDOCAINE HYDROCHLORIDE 10 MG/ML
INJECTION, SOLUTION EPIDURAL; INFILTRATION; INTRACAUDAL; PERINEURAL AS NEEDED
Status: DISCONTINUED | OUTPATIENT
Start: 2020-11-16 | End: 2020-11-16 | Stop reason: SURG

## 2020-11-16 RX ADMIN — EPHEDRINE SULFATE 10 MG: 50 INJECTION, SOLUTION INTRAVENOUS at 08:18:00

## 2020-11-16 RX ADMIN — EPHEDRINE SULFATE 10 MG: 50 INJECTION, SOLUTION INTRAVENOUS at 07:59:00

## 2020-11-16 RX ADMIN — CEFAZOLIN SODIUM/WATER 2 G: 2 G/20 ML SYRINGE (ML) INTRAVENOUS at 07:50:00

## 2020-11-16 RX ADMIN — LIDOCAINE HYDROCHLORIDE 25 MG: 10 INJECTION, SOLUTION EPIDURAL; INFILTRATION; INTRACAUDAL; PERINEURAL at 07:38:00

## 2020-11-16 RX ADMIN — SODIUM CHLORIDE, SODIUM LACTATE, POTASSIUM CHLORIDE, CALCIUM CHLORIDE: 600; 310; 30; 20 INJECTION, SOLUTION INTRAVENOUS at 07:34:00

## 2020-11-16 RX ADMIN — ONDANSETRON 4 MG: 2 INJECTION INTRAMUSCULAR; INTRAVENOUS at 07:50:00

## 2020-11-16 RX ADMIN — MIDAZOLAM HYDROCHLORIDE 2 MG: 1 INJECTION INTRAMUSCULAR; INTRAVENOUS at 07:34:00

## 2020-11-16 RX ADMIN — SODIUM CHLORIDE, SODIUM LACTATE, POTASSIUM CHLORIDE, CALCIUM CHLORIDE: 600; 310; 30; 20 INJECTION, SOLUTION INTRAVENOUS at 09:06:00

## 2020-11-16 NOTE — ANESTHESIA PREPROCEDURE EVALUATION
Anesthesia PreOp Note    HPI:     Derrek Rivera is a 44year old female who presents for preoperative consultation requested by: Rick Rich MD    Date of Surgery: 11/16/2020    Procedure(s):  EXCISION LESION HEAD/NECK/FACE  Indication: Hemangiom Comment:Redness/swelling to face,chest then SOB    Family History   Problem Relation Age of Onset   • Hypertension Father    • Heart Disease Mother         CAD   • Hypertension Mother    • Diabetes Neg    • Glaucoma Neg    • Macular degeneration Neg      S Concern: Yes          (Coffee, Tea) 2 cups daily        Occupational Exposure: Not Asked        Hobby Hazards: Not Asked        Sleep Concern: Not Asked        Stress Concern: Not Asked        Weight Concern: Not Asked        Special Diet: Not Asked Airway   Mallampati: II  TM distance: >3 FB  Neck ROM: full  Dental      Comment: Patient denies any additional loose, missing, and chipped teeth.       Pulmonary - negative ROS and normal exam    breath sounds clear to auscultation  (-) asthma, shortness o

## 2020-11-16 NOTE — ANESTHESIA PROCEDURE NOTES
Airway  Date/Time: 11/16/2020 7:40 AM  Urgency: Elective    Airway not difficult    General Information and Staff    Patient location during procedure: OR  Anesthesiologist: Dillan Reynoso MD  Resident/CRNA: Marylu Rogel CRNA  Performed: CRNA

## 2020-11-16 NOTE — INTERVAL H&P NOTE
Chief Complaint: lipoma neck R , forehead hemangioma subcut     History of Present Illness:   Fran Caba is a 44year old female who presents with a L neck lipoma, she reports pain in her arm and the lipoma growing in size.  She had an US that conf fever, chills, night sweats, fatigue, generalized weakness, change in appetite, weight loss, or weight gain. Endocrine:    There is no history of poor/slow wound healing, weight loss/gain, fertility or hormone problems, cold intolerance, heat intolerance, pain.  Neurologic:  There is no history of +migraines or severe headaches, seizure/epilepsy, speech problems, coordination problems, trembling/tremors, fainting/black outs, dizziness, memory problems, loss of sensation/numbness, problems walking, weakness, procedures and the postoperative care was discussed in detail. Potential risks complications benefits and alternatives were discussed.   Risks and complications including but not limited to infection, bleeding, scarring, damage to surrounding structures, s

## 2020-11-16 NOTE — BRIEF OP NOTE
Pre-Operative Diagnosis: Hemangioma of subcutaneous tissue [D18.01]  Lipoma of neck [D17.0]     Post-Operative Diagnosis: Hemangioma of subcutaneous tissue [D18.01]Lipoma of neck [D17.0]      Procedure Performed:   Procedure(s):  Excision of forehead deep

## 2020-11-16 NOTE — ANESTHESIA POSTPROCEDURE EVALUATION
Patient: Derrek Rivera    Procedure Summary     Date: 11/16/20 Room / Location: [facility] OR  / [facility] OR    Anesthesia Start: 1720 Anesthesia Stop: 8683    Procedure: EXCISION LESION HEAD/NECK/FACE (N/A Head) Diagnosis:       Hemangioma of subcut

## 2020-11-17 NOTE — OPERATIVE REPORT
[facility]    PATIENT'S NAME: Minor Lopez   ATTENDING PHYSICIAN: Madison Luz MD   OPERATING PHYSICIAN: Madison Luz MD   PATIENT ACCOUNT#:   [id number]    LOCATION:  [address]  MEDICAL RECORD #:   [id number]       DATE OF including, but not limited to infection, bleeding, scarring, damage to surrounding structures, hematoma, seroma, nerve damage, need for further surgery. Patient understands and wishes to proceed. Questions were answered.       OPERATIVE TECHNIQUE:  The pa excised on the forehead and sent to Pathology. Then, careful dissection was carried down to the frontalis muscle. The frontalis muscle then was gently split, and the soft tissue mass was encountered. This clinically looked like a lipoma.   It was traced

## 2020-11-23 ENCOUNTER — OFFICE VISIT (OUTPATIENT)
Dept: SURGERY | Facility: CLINIC | Age: 50
End: 2020-11-23
Payer: COMMERCIAL

## 2020-11-23 DIAGNOSIS — D17.0 LIPOMA OF NECK: Primary | ICD-10-CM

## 2020-11-23 PROCEDURE — 99024 POSTOP FOLLOW-UP VISIT: CPT | Performed by: PHYSICIAN ASSISTANT

## 2020-11-23 NOTE — PROGRESS NOTES
GI RNs-Can you please enter recall for 2 months, patient wants to schedule next year but unsure for when at this moment.

## 2020-11-23 NOTE — PROGRESS NOTES
Cirilo Cabrera is a 44year old female who presents today for a follow-up after excision of complex right neck lipoma, complex layered wound closure right neck, excision soft tissue mass lipoma forehead, submuscular, complex layered wound closure fore questions or concerns. Questions were answered. Patient understands.      Cody Vanegas  11/23/2020  11:32 AM

## 2020-11-24 ENCOUNTER — TELEPHONE (OUTPATIENT)
Dept: GASTROENTEROLOGY | Facility: CLINIC | Age: 50
End: 2020-11-24

## 2020-11-24 NOTE — PROGRESS NOTES
Please see below. Unable to enter recall in a colon screening encounter. Entered separate 11/24 encounter for recall.

## 2020-11-24 NOTE — TELEPHONE ENCOUNTER
See 9/30 phone screen encounter where GI  spoke to patient on 11/23--patient is asking for 2 month recall, as does not want to do colon yet. See that encounter for Dr Rodrigues's colon orders already entered.     Recall colon in 2 months per patient r

## 2020-12-04 ENCOUNTER — OFFICE VISIT (OUTPATIENT)
Dept: SURGERY | Facility: CLINIC | Age: 50
End: 2020-12-04
Payer: COMMERCIAL

## 2020-12-04 DIAGNOSIS — D17.0 LIPOMA OF NECK: Primary | ICD-10-CM

## 2020-12-04 PROCEDURE — 99024 POSTOP FOLLOW-UP VISIT: CPT | Performed by: SURGERY

## 2020-12-04 NOTE — PROGRESS NOTES
Odalys Mandujano is a 44year old female who presents today for a follow-up after excision of complex right neck lipoma, complex layered wound closure right neck, excision soft tissue mass lipoma forehead, submuscular, complex layered wound closure fore

## 2020-12-07 ENCOUNTER — HOSPITAL ENCOUNTER (OUTPATIENT)
Dept: MAMMOGRAPHY | Age: 50
Discharge: HOME | End: 2020-12-07
Attending: INTERNAL MEDICINE
Payer: COMMERCIAL

## 2020-12-07 DIAGNOSIS — Z12.31 BREAST CANCER SCREENING BY MAMMOGRAM: ICD-10-CM

## 2020-12-07 PROCEDURE — 77067 SCR MAMMO BI INCL CAD: CPT | Performed by: INTERNAL MEDICINE

## 2020-12-07 PROCEDURE — 77063 BREAST TOMOSYNTHESIS BI: CPT | Performed by: INTERNAL MEDICINE

## 2020-12-18 ENCOUNTER — OFFICE VISIT (OUTPATIENT)
Dept: INTERNAL MEDICINE CLINIC | Facility: CLINIC | Age: 50
End: 2020-12-18
Payer: COMMERCIAL

## 2020-12-18 VITALS
WEIGHT: 156 LBS | SYSTOLIC BLOOD PRESSURE: 128 MMHG | OXYGEN SATURATION: 98 % | DIASTOLIC BLOOD PRESSURE: 80 MMHG | RESPIRATION RATE: 14 BRPM | TEMPERATURE: 98 F | HEART RATE: 86 BPM | HEIGHT: 63 IN | BODY MASS INDEX: 27.64 KG/M2

## 2020-12-18 DIAGNOSIS — Z00.00 ADULT GENERAL MEDICAL EXAM: Primary | ICD-10-CM

## 2020-12-18 PROCEDURE — 3079F DIAST BP 80-89 MM HG: CPT | Performed by: INTERNAL MEDICINE

## 2020-12-18 PROCEDURE — 99396 PREV VISIT EST AGE 40-64: CPT | Performed by: INTERNAL MEDICINE

## 2020-12-18 PROCEDURE — 3074F SYST BP LT 130 MM HG: CPT | Performed by: INTERNAL MEDICINE

## 2020-12-18 PROCEDURE — 3008F BODY MASS INDEX DOCD: CPT | Performed by: INTERNAL MEDICINE

## 2020-12-18 NOTE — PROGRESS NOTES
Shaw Collins is a 44year old female. Patient presents with:  Physical  Injection: Declined Flu vaccine     HPI:   49-year-old pleasant lady here for physical.  She has no complaints.     Past medical history none    Past surgical history lipoma surge appetite change, fever and unexpected weight change. HENT: Negative for congestion, ear pain, nosebleeds and sore throat. Eyes: Negative for pain. Respiratory: Negative for cough, chest tightness and wheezing.     Cardiovascular: Negative for chest p sounds are normal. She exhibits no distension. There is no abdominal tenderness. There is no rebound and no guarding. Neurological: She is alert and oriented to person, place, and time. No cranial nerve deficit, sensory deficit or motor deficit.  Coordina

## 2021-01-13 ENCOUNTER — TELEPHONE (OUTPATIENT)
Dept: GASTROENTEROLOGY | Facility: CLINIC | Age: 51
End: 2021-01-13

## 2021-01-13 DIAGNOSIS — Z12.11 COLON CANCER SCREENING: Primary | ICD-10-CM

## 2021-01-19 ENCOUNTER — TELEPHONE (OUTPATIENT)
Dept: SURGERY | Facility: CLINIC | Age: 51
End: 2021-01-19

## 2021-01-19 NOTE — TELEPHONE ENCOUNTER
Returning pt's call with an update regarding the EOB she received stating her insurance was not paying for her procedure with Dr. Anabella Ross.  I let her know that I had the charge capture specialist take a look and that it wasn't denied, [facility] was just requesting

## 2021-01-21 RX ORDER — POLYETHYLENE GLYCOL 3350, SODIUM CHLORIDE, POTASSIUM CHLORIDE, SODIUM BICARBONATE, AND SODIUM SULFATE 240; 5.84; 2.98; 6.72; 22.72 G/4L; G/4L; G/4L; G/4L; G/4L
POWDER, FOR SOLUTION ORAL
Qty: 1 BOTTLE | Refills: 0 | Status: SHIPPED | OUTPATIENT
Start: 2021-01-21 | End: 2022-01-15 | Stop reason: ALTCHOICE

## 2021-01-21 NOTE — TELEPHONE ENCOUNTER
Scheduled for:  Colonoscopy - 68932  Provider Name:  Dr. Renée Jackson  Date:  2/11/21  Location:  [facility]  Sedation:  MAC  Time:  Approx 9:30 am (pt is aware that [facility] will call with arrival time)  Prep:  Golytely, Prep instructions were given to pt over the phone, p

## 2021-01-22 NOTE — TELEPHONE ENCOUNTER
Prescription sent to [facility].     Thanks Aury!!!!!

## 2021-01-28 ENCOUNTER — TELEPHONE (OUTPATIENT)
Dept: GASTROENTEROLOGY | Facility: CLINIC | Age: 51
End: 2021-01-28

## 2021-01-28 NOTE — TELEPHONE ENCOUNTER
Patient canceled procedure due to work conflict, she will call when she is able to have day off. Canceled off epic as well as omp/ct.     CAnceled for:  Colonoscopy - 32140  Provider Name:  Dr. Shawanda Garcia  Date:  2/11/21  Location:  [facility]  Sedation:  MAC  Time

## 2021-04-20 ENCOUNTER — NURSE TRIAGE (OUTPATIENT)
Dept: INTERNAL MEDICINE CLINIC | Facility: CLINIC | Age: 51
End: 2021-04-20

## 2021-04-20 NOTE — TELEPHONE ENCOUNTER
Patient was informed by dentist; has clots in inner buccal. Patient needs oral workup. Has infection on lower gum line. Dentist will need medical clearance before dentist does any work in mouth. appt made 4/22/21.

## 2021-04-29 ENCOUNTER — OFFICE VISIT (OUTPATIENT)
Dept: INTERNAL MEDICINE CLINIC | Facility: CLINIC | Age: 51
End: 2021-04-29
Payer: COMMERCIAL

## 2021-04-29 VITALS
OXYGEN SATURATION: 96 % | HEIGHT: 63 IN | DIASTOLIC BLOOD PRESSURE: 70 MMHG | RESPIRATION RATE: 14 BRPM | WEIGHT: 161 LBS | BODY MASS INDEX: 28.53 KG/M2 | HEART RATE: 92 BPM | SYSTOLIC BLOOD PRESSURE: 120 MMHG

## 2021-04-29 DIAGNOSIS — G89.29 CHRONIC RIGHT SHOULDER PAIN: ICD-10-CM

## 2021-04-29 DIAGNOSIS — M25.511 CHRONIC RIGHT SHOULDER PAIN: ICD-10-CM

## 2021-04-29 DIAGNOSIS — I83.893 VARICOSE VEINS OF BOTH LOWER EXTREMITIES WITH COMPLICATIONS: ICD-10-CM

## 2021-04-29 DIAGNOSIS — H53.8 BLURRY VISION, LEFT EYE: Primary | ICD-10-CM

## 2021-04-29 PROCEDURE — 3008F BODY MASS INDEX DOCD: CPT | Performed by: INTERNAL MEDICINE

## 2021-04-29 PROCEDURE — 99213 OFFICE O/P EST LOW 20 MIN: CPT | Performed by: INTERNAL MEDICINE

## 2021-04-29 PROCEDURE — 3074F SYST BP LT 130 MM HG: CPT | Performed by: INTERNAL MEDICINE

## 2021-04-29 PROCEDURE — 3078F DIAST BP <80 MM HG: CPT | Performed by: INTERNAL MEDICINE

## 2021-04-29 NOTE — PROGRESS NOTES
Ashley Garcia is a 46year old female. Patient presents with: Follow - Up: Medical Clearance for Dental procedure   Eye Problem: Left eye swollen     HPI:   61-year-old female here for evaluation of left eye problem.   She reports that she gets cyst i Comment:Redness/swelling to face,chest then SOB     REVIEW OF SYSTEMS:     Review of Systems   Constitutional: Negative for activity change, appetite change and fever. HENT: Negative for congestion and voice change.     Respiratory: Negative for cough and Blurry vision, left eye  Ophthalmology referral given  - OPHTHALMOLOGY - INTERNAL    2. Chronic right shoulder pain  She does have crepitus on the right shoulder. Will obtain x-ray of the shoulder.   If needed, will refer to orthopedics  - XR SHOULDER, COM

## 2021-06-02 NOTE — TELEPHONE ENCOUNTER
Pt informed order for EKG has been written and pt needs to schedule with outpatient testing for appt. Pt states she completed stool cards and sent to [facility] lab. Skyrizi Counseling: I discussed with the patient the risks of risankizumab-rzaa including but not limited to immunosuppression, and serious infections.  The patient understands that monitoring is required including a PPD at baseline and must alert us or the primary physician if symptoms of infection or other concerning signs are noted.

## 2021-06-04 ENCOUNTER — HOSPITAL ENCOUNTER (OUTPATIENT)
Dept: GENERAL RADIOLOGY | Age: 51
Discharge: HOME | End: 2021-06-04
Attending: INTERNAL MEDICINE
Payer: COMMERCIAL

## 2021-06-04 DIAGNOSIS — G89.29 CHRONIC RIGHT SHOULDER PAIN: ICD-10-CM

## 2021-06-04 DIAGNOSIS — M25.511 CHRONIC RIGHT SHOULDER PAIN: ICD-10-CM

## 2021-06-04 PROCEDURE — 73030 X-RAY EXAM OF SHOULDER: CPT | Performed by: INTERNAL MEDICINE

## 2021-07-29 ENCOUNTER — HOSPITAL ENCOUNTER (OUTPATIENT)
Dept: ULTRASOUND IMAGING | Facility: HOSPITAL | Age: 51
Discharge: HOME | End: 2021-07-29
Attending: RADIOLOGY
Payer: COMMERCIAL

## 2021-07-29 DIAGNOSIS — I83.813 VARICOSE VEINS OF LOWER EXTREMITY WITH PAIN, BILATERAL: ICD-10-CM

## 2021-07-29 PROCEDURE — 93970 EXTREMITY STUDY: CPT | Performed by: RADIOLOGY

## 2021-09-15 ENCOUNTER — TELEPHONE (OUTPATIENT)
Dept: GASTROENTEROLOGY | Facility: CLINIC | Age: 51
End: 2021-09-15

## 2021-09-15 DIAGNOSIS — Z12.11 COLON CANCER SCREENING: Primary | ICD-10-CM

## 2021-09-15 NOTE — TELEPHONE ENCOUNTER
I spoke to the pt. She has had no changes in her health or medications since her GI Telephone screening on 09/30/2020. She already has her bowel prep which she picked up about a month ago. She was scheduled for 02/11/21 and then cancelled .  She is

## 2021-09-20 NOTE — TELEPHONE ENCOUNTER
Thanks all. Recent office visits with Dr. Yanira Quiñonez 4/29/2021 and annual health checkup exam of 12/18/2020 reviewed. Very healthy woman. Former smoker.     BMI Readings from Last 1 Encounters:  04/29/21 : 28.52 kg/m²       My previous colonoscopy

## 2021-10-05 NOTE — TELEPHONE ENCOUNTER
I contacted patient and scheduled her Colonoscopy, patient agreed to Mychart instructions  See TE 1/28/2021 and TE 1/13/2021    RE-Scheduled for: Colonoscopy 30618  Provider Name: Dr Nelson Trevino  Date: Ramakrishna Sravan 12/23/2021   Location: [facility]   Sedation: MAC   Time: 1

## 2021-12-20 ENCOUNTER — LAB REQUISITION (OUTPATIENT)
Dept: SURGERY | Age: 51
End: 2021-12-20
Payer: COMMERCIAL

## 2021-12-20 DIAGNOSIS — Z01.818 PREOP EXAMINATION: ICD-10-CM

## 2021-12-21 ENCOUNTER — OFFICE VISIT (OUTPATIENT)
Dept: INTERNAL MEDICINE CLINIC | Facility: CLINIC | Age: 51
End: 2021-12-21
Payer: COMMERCIAL

## 2021-12-21 VITALS
SYSTOLIC BLOOD PRESSURE: 126 MMHG | BODY MASS INDEX: 27.82 KG/M2 | OXYGEN SATURATION: 97 % | HEART RATE: 74 BPM | WEIGHT: 157 LBS | RESPIRATION RATE: 14 BRPM | DIASTOLIC BLOOD PRESSURE: 80 MMHG | HEIGHT: 63 IN

## 2021-12-21 DIAGNOSIS — Z12.31 BREAST CANCER SCREENING BY MAMMOGRAM: ICD-10-CM

## 2021-12-21 DIAGNOSIS — Z12.11 SCREEN FOR COLON CANCER: ICD-10-CM

## 2021-12-21 DIAGNOSIS — I83.893 VARICOSE VEINS OF BOTH LOWER EXTREMITIES WITH COMPLICATIONS: Primary | ICD-10-CM

## 2021-12-21 DIAGNOSIS — D17.1 LIPOMA OF TORSO: ICD-10-CM

## 2021-12-21 PROCEDURE — 99214 OFFICE O/P EST MOD 30 MIN: CPT | Performed by: INTERNAL MEDICINE

## 2021-12-21 PROCEDURE — 3074F SYST BP LT 130 MM HG: CPT | Performed by: INTERNAL MEDICINE

## 2021-12-21 PROCEDURE — 3079F DIAST BP 80-89 MM HG: CPT | Performed by: INTERNAL MEDICINE

## 2021-12-21 PROCEDURE — 3008F BODY MASS INDEX DOCD: CPT | Performed by: INTERNAL MEDICINE

## 2021-12-21 NOTE — PROGRESS NOTES
Farshad Lema is a 46year old female. Patient presents with: Follow - Up: Vein U/s F/u     HPI:   57-year-old pleasant lady here for evaluation of varicosities and for a lump in her right side of the abdomen.   She reports that she tried to get the a BREATH    Comment:Redness/swelling to face,chest then SOB             Redness/swelling to face,chest then SOB     REVIEW OF SYSTEMS:   Review of Systems   Review of Systems   Constitutional: Negative for activity change, appetite normal.         Behavior: Behavior normal.   Well-defined 5 x 3 cm oval-shaped mass palpated. Most likely lipoma. Varicosities present bilaterally    ASSESSMENT AND PLAN:   1.  Varicose veins of both lower extremities with complications  Encourage patient

## 2021-12-23 ENCOUNTER — SURGERY CENTER DOCUMENTATION (OUTPATIENT)
Dept: SURGERY | Age: 51
End: 2021-12-23

## 2021-12-23 NOTE — PROCEDURES
[facility]      COLONOSCOPY PROCEDURE REPORT     DATE OF PROCEDURE:  12/23/2021     PCP: Aubrey Lauren MD     PREOPERATIVE DIAGNOSIS: Screening colonoscopy examination     POSTOPERATIVE DIAGNOSIS:  See impression.      SURGEON:  Mechelle

## 2021-12-30 ENCOUNTER — TELEPHONE (OUTPATIENT)
Dept: GASTROENTEROLOGY | Facility: CLINIC | Age: 51
End: 2021-12-30

## 2022-01-03 ENCOUNTER — OFFICE VISIT (OUTPATIENT)
Dept: SURGERY | Facility: CLINIC | Age: 52
End: 2022-01-03
Payer: COMMERCIAL

## 2022-01-03 VITALS — HEIGHT: 63 IN | BODY MASS INDEX: 27.82 KG/M2 | WEIGHT: 157 LBS

## 2022-01-03 DIAGNOSIS — M79.89 SOFT TISSUE MASS: Primary | ICD-10-CM

## 2022-01-03 PROCEDURE — 99214 OFFICE O/P EST MOD 30 MIN: CPT | Performed by: SURGERY

## 2022-01-03 PROCEDURE — 3008F BODY MASS INDEX DOCD: CPT | Performed by: SURGERY

## 2022-01-03 NOTE — H&P (VIEW-ONLY)
Chief complaint: Patient presents with:  Mass: Referred by Dr. Loar Hill for lipoma of torso       HPI: Radha Amanda has a rather quickly enlarging soft tissue mass of the R flank.      Past medical history:   Past Medical History:   Diagnosis Date   • Anemi ulcerated or worrisome skin lesions  EYES:denies blurred vision or double vision  HEENT: denies new nasal congestion, sinus pain or ST  LUNGS: denies shortness of breath with exertion  CARDIOVASCULAR: denies chest pain on exertion  GI: no hematemesis, no B

## 2022-01-03 NOTE — H&P
Chief complaint: Patient presents with:  Mass: Referred by Dr. Sheila Villanueva for lipoma of torso       HPI: Rossi Silver has a rather quickly enlarging soft tissue mass of the R flank.      Past medical history:   Past Medical History:   Diagnosis Date   • Anemi ulcerated or worrisome skin lesions  EYES:denies blurred vision or double vision  HEENT: denies new nasal congestion, sinus pain or ST  LUNGS: denies shortness of breath with exertion  CARDIOVASCULAR: denies chest pain on exertion  GI: no hematemesis, no B

## 2022-01-06 ENCOUNTER — TELEPHONE (OUTPATIENT)
Dept: GASTROENTEROLOGY | Facility: CLINIC | Age: 52
End: 2022-01-06

## 2022-01-06 NOTE — TELEPHONE ENCOUNTER
I mailed out colonoscopy results letter to pt  Updated health maintenance  Entered into 7 yr CLN recall  Recall colon in 7 years per. Colon done 12/23/2021        Sammy Alva MD  [facility] Clinical Staff  GI RNs - 1.  Please print and mail this

## 2022-01-10 ENCOUNTER — TELEPHONE (OUTPATIENT)
Dept: SURGERY | Facility: CLINIC | Age: 52
End: 2022-01-10

## 2022-01-10 NOTE — TELEPHONE ENCOUNTER
Called and spoke to [facility] for prior auth for cpt code 64978. Pending ref. # [id number]

## 2022-01-12 ENCOUNTER — HOSPITAL ENCOUNTER (OUTPATIENT)
Dept: ULTRASOUND IMAGING | Facility: HOSPITAL | Age: 52
Discharge: HOME | End: 2022-01-12
Attending: RADIOLOGY
Payer: COMMERCIAL

## 2022-01-12 DIAGNOSIS — I83.813 VARICOSE VEINS OF LEG WITH PAIN, BILATERAL: ICD-10-CM

## 2022-01-15 RX ORDER — ACETAMINOPHEN 160 MG
2000 TABLET,DISINTEGRATING ORAL DAILY
COMMUNITY

## 2022-01-16 ENCOUNTER — LAB ENCOUNTER (OUTPATIENT)
Dept: LAB | Facility: HOSPITAL | Age: 52
End: 2022-01-16
Attending: SURGERY
Payer: COMMERCIAL

## 2022-01-16 DIAGNOSIS — Z01.818 PREOP TESTING: ICD-10-CM

## 2022-01-17 LAB — SARS-COV-2 RNA RESP QL NAA+PROBE: NOT DETECTED

## 2022-01-18 ENCOUNTER — ANESTHESIA (OUTPATIENT)
Dept: SURGERY | Facility: HOSPITAL | Age: 52
End: 2022-01-18
Payer: COMMERCIAL

## 2022-01-18 ENCOUNTER — HOSPITAL ENCOUNTER (OUTPATIENT)
Facility: HOSPITAL | Age: 52
Setting detail: HOSPITAL OUTPATIENT SURGERY
Discharge: HOME | End: 2022-01-18
Attending: SURGERY | Admitting: SURGERY
Payer: COMMERCIAL

## 2022-01-18 ENCOUNTER — ANESTHESIA EVENT (OUTPATIENT)
Dept: SURGERY | Facility: HOSPITAL | Age: 52
End: 2022-01-18
Payer: COMMERCIAL

## 2022-01-18 VITALS
TEMPERATURE: 97 F | DIASTOLIC BLOOD PRESSURE: 65 MMHG | OXYGEN SATURATION: 100 % | SYSTOLIC BLOOD PRESSURE: 109 MMHG | HEIGHT: 63 IN | WEIGHT: 157.38 LBS | BODY MASS INDEX: 27.89 KG/M2 | RESPIRATION RATE: 16 BRPM | HEART RATE: 65 BPM

## 2022-01-18 DIAGNOSIS — M79.89 SOFT TISSUE MASS: ICD-10-CM

## 2022-01-18 DIAGNOSIS — Z01.818 PREOP TESTING: Primary | ICD-10-CM

## 2022-01-18 LAB — B-HCG UR QL: NEGATIVE

## 2022-01-18 PROCEDURE — 12032 INTMD RPR S/A/T/EXT 2.6-7.5: CPT | Performed by: SURGERY

## 2022-01-18 PROCEDURE — 0JB80ZZ EXCISION OF ABDOMEN SUBCUTANEOUS TISSUE AND FASCIA, OPEN APPROACH: ICD-10-PCS | Performed by: SURGERY

## 2022-01-18 PROCEDURE — 11406 EXC TR-EXT B9+MARG >4.0 CM: CPT | Performed by: SURGERY

## 2022-01-18 RX ORDER — BUPIVACAINE HYDROCHLORIDE AND EPINEPHRINE 2.5; 5 MG/ML; UG/ML
INJECTION, SOLUTION INFILTRATION; PERINEURAL AS NEEDED
Status: DISCONTINUED | OUTPATIENT
Start: 2022-01-18 | End: 2022-01-18 | Stop reason: HOSPADM

## 2022-01-18 RX ORDER — MORPHINE SULFATE 4 MG/ML
2 INJECTION, SOLUTION INTRAMUSCULAR; INTRAVENOUS EVERY 10 MIN PRN
Status: DISCONTINUED | OUTPATIENT
Start: 2022-01-18 | End: 2022-01-18

## 2022-01-18 RX ORDER — HALOPERIDOL 5 MG/ML
0.25 INJECTION INTRAMUSCULAR ONCE AS NEEDED
Status: DISCONTINUED | OUTPATIENT
Start: 2022-01-18 | End: 2022-01-18

## 2022-01-18 RX ORDER — SODIUM CHLORIDE, SODIUM LACTATE, POTASSIUM CHLORIDE, CALCIUM CHLORIDE 600; 310; 30; 20 MG/100ML; MG/100ML; MG/100ML; MG/100ML
INJECTION, SOLUTION INTRAVENOUS CONTINUOUS
Status: DISCONTINUED | OUTPATIENT
Start: 2022-01-18 | End: 2022-01-18

## 2022-01-18 RX ORDER — HYDROCODONE BITARTRATE AND ACETAMINOPHEN 5; 325 MG/1; MG/1
2 TABLET ORAL AS NEEDED
Status: DISCONTINUED | OUTPATIENT
Start: 2022-01-18 | End: 2022-01-18

## 2022-01-18 RX ORDER — ACETAMINOPHEN 500 MG
1000 TABLET ORAL ONCE
Status: COMPLETED | OUTPATIENT
Start: 2022-01-18 | End: 2022-01-18

## 2022-01-18 RX ORDER — ONDANSETRON 2 MG/ML
INJECTION INTRAMUSCULAR; INTRAVENOUS AS NEEDED
Status: DISCONTINUED | OUTPATIENT
Start: 2022-01-18 | End: 2022-01-18 | Stop reason: SURG

## 2022-01-18 RX ORDER — ONDANSETRON 2 MG/ML
4 INJECTION INTRAMUSCULAR; INTRAVENOUS ONCE AS NEEDED
Status: DISCONTINUED | OUTPATIENT
Start: 2022-01-18 | End: 2022-01-18

## 2022-01-18 RX ORDER — HYDROMORPHONE HYDROCHLORIDE 1 MG/ML
0.6 INJECTION, SOLUTION INTRAMUSCULAR; INTRAVENOUS; SUBCUTANEOUS EVERY 5 MIN PRN
Status: DISCONTINUED | OUTPATIENT
Start: 2022-01-18 | End: 2022-01-18

## 2022-01-18 RX ORDER — MORPHINE SULFATE 10 MG/ML
6 INJECTION, SOLUTION INTRAMUSCULAR; INTRAVENOUS EVERY 10 MIN PRN
Status: DISCONTINUED | OUTPATIENT
Start: 2022-01-18 | End: 2022-01-18

## 2022-01-18 RX ORDER — HYDROCODONE BITARTRATE AND ACETAMINOPHEN 5; 325 MG/1; MG/1
1 TABLET ORAL EVERY 6 HOURS PRN
Qty: 6 TABLET | Refills: 0 | Status: SHIPPED | OUTPATIENT
Start: 2022-01-18

## 2022-01-18 RX ORDER — LIDOCAINE HYDROCHLORIDE 10 MG/ML
INJECTION, SOLUTION EPIDURAL; INFILTRATION; INTRACAUDAL; PERINEURAL AS NEEDED
Status: DISCONTINUED | OUTPATIENT
Start: 2022-01-18 | End: 2022-01-18 | Stop reason: SURG

## 2022-01-18 RX ORDER — CEFAZOLIN SODIUM/WATER 2 G/20 ML
2 SYRINGE (ML) INTRAVENOUS ONCE
Status: COMPLETED | OUTPATIENT
Start: 2022-01-18 | End: 2022-01-18

## 2022-01-18 RX ORDER — HYDROMORPHONE HYDROCHLORIDE 1 MG/ML
0.2 INJECTION, SOLUTION INTRAMUSCULAR; INTRAVENOUS; SUBCUTANEOUS EVERY 5 MIN PRN
Status: DISCONTINUED | OUTPATIENT
Start: 2022-01-18 | End: 2022-01-18

## 2022-01-18 RX ORDER — MORPHINE SULFATE 4 MG/ML
4 INJECTION, SOLUTION INTRAMUSCULAR; INTRAVENOUS EVERY 10 MIN PRN
Status: DISCONTINUED | OUTPATIENT
Start: 2022-01-18 | End: 2022-01-18

## 2022-01-18 RX ORDER — HYDROMORPHONE HYDROCHLORIDE 1 MG/ML
0.4 INJECTION, SOLUTION INTRAMUSCULAR; INTRAVENOUS; SUBCUTANEOUS EVERY 5 MIN PRN
Status: DISCONTINUED | OUTPATIENT
Start: 2022-01-18 | End: 2022-01-18

## 2022-01-18 RX ORDER — NALOXONE HYDROCHLORIDE 0.4 MG/ML
80 INJECTION, SOLUTION INTRAMUSCULAR; INTRAVENOUS; SUBCUTANEOUS AS NEEDED
Status: DISCONTINUED | OUTPATIENT
Start: 2022-01-18 | End: 2022-01-18

## 2022-01-18 RX ORDER — PROCHLORPERAZINE EDISYLATE 5 MG/ML
5 INJECTION INTRAMUSCULAR; INTRAVENOUS ONCE AS NEEDED
Status: DISCONTINUED | OUTPATIENT
Start: 2022-01-18 | End: 2022-01-18

## 2022-01-18 RX ORDER — HYDROCODONE BITARTRATE AND ACETAMINOPHEN 5; 325 MG/1; MG/1
1 TABLET ORAL AS NEEDED
Status: DISCONTINUED | OUTPATIENT
Start: 2022-01-18 | End: 2022-01-18

## 2022-01-18 RX ADMIN — LIDOCAINE HYDROCHLORIDE 50 MG: 10 INJECTION, SOLUTION EPIDURAL; INFILTRATION; INTRACAUDAL; PERINEURAL at 07:35:00

## 2022-01-18 RX ADMIN — CEFAZOLIN SODIUM/WATER 2 G: 2 G/20 ML SYRINGE (ML) INTRAVENOUS at 07:39:00

## 2022-01-18 RX ADMIN — SODIUM CHLORIDE, SODIUM LACTATE, POTASSIUM CHLORIDE, CALCIUM CHLORIDE: 600; 310; 30; 20 INJECTION, SOLUTION INTRAVENOUS at 08:09:00

## 2022-01-18 RX ADMIN — ONDANSETRON 4 MG: 2 INJECTION INTRAMUSCULAR; INTRAVENOUS at 08:03:00

## 2022-01-18 RX ADMIN — SODIUM CHLORIDE, SODIUM LACTATE, POTASSIUM CHLORIDE, CALCIUM CHLORIDE: 600; 310; 30; 20 INJECTION, SOLUTION INTRAVENOUS at 07:31:00

## 2022-01-18 NOTE — INTERVAL H&P NOTE
Pre-op Diagnosis: Soft tissue mass [M79.89]    The above referenced H&P was reviewed by Adele Vann MD on 1/18/2022, the patient was examined and no significant changes have occurred in the patient's condition since the H&P was performed.   I discussed

## 2022-01-18 NOTE — ANESTHESIA POSTPROCEDURE EVALUATION
Patient: Ana Grimaldo    Procedure Summary     Date: 01/18/22 Room / Location: [address] [place] [address] MAIN OR 08 / [facility] MAIN OR    Anesthesia Start: 2203 Anesthesia Stop: 2793    Procedure: excision of soft tissue, right flank (Right ) Diagnosis:       Soft tissue mass

## 2022-01-18 NOTE — OPERATIVE REPORT
Pre-Operative Diagnosis: Large soft tissue mass of the right lateral abdominal wall, flank and back     Post-Operative Diagnosis: same     Procedure Performed:   Excision of 9 cm soft tissue mass of the right lateral abdominal wall, flank and back  Layered

## 2022-01-18 NOTE — ANESTHESIA PROCEDURE NOTES
Airway  Date/Time: 1/18/2022 7:37 AM  Urgency: Elective      General Information and Staff    Patient location during procedure: OR  Anesthesiologist: Ilana Herron DO  Performed: anesthesiologist     Indications and Patient Condition  Indications for air

## 2022-01-18 NOTE — ANESTHESIA PREPROCEDURE EVALUATION
Anesthesia PreOp Note    HPI:     Fady Sevilla is a 46year old female who presents for preoperative consultation requested by: Pedro Kaba MD    Date of Surgery: 1/18/2022    Procedure(s):  excision lipoma, right flank  Indication: Soft tiss Comment:Redness/swelling to face,chest then SOB    Family History   Problem Relation Age of Onset   • Hypertension Father    • Heart Disease Mother         CAD   • Hypertension Mother    • Diabetes Neg    • Glaucoma Neg    • Macular degeneration Neg      S file    Social Determinants of Health  Financial Resource Strain: Not on file  Food Insecurity: Not on file  Transportation Needs: Not on file  Physical Activity: Not on file  Stress: Not on file  Social Connections: Not on file  Intimate Partner Violence:

## 2022-02-14 ENCOUNTER — OFFICE VISIT (OUTPATIENT)
Dept: SURGERY | Facility: CLINIC | Age: 52
End: 2022-02-14
Payer: COMMERCIAL

## 2022-02-14 DIAGNOSIS — Z98.890 POST-OPERATIVE STATE: Primary | ICD-10-CM

## 2022-02-14 PROCEDURE — 99024 POSTOP FOLLOW-UP VISIT: CPT | Performed by: SURGERY

## 2022-02-16 NOTE — PROGRESS NOTES
S:  Jeannette Flanagan is a 46year old female sp lipoma excision doing well, no fevers, no chills, tolerating a general diet, generally normal bowel movements, no calf tenderness nor lower extremity edema, no shortness of breath, no chest pain. O:  LMP 12/25/2021   GEN:  No acute distress  L: nonlabored respirations, CTA  H: reg rate  Abd:  Soft, NT,ND. Skin: Incision C D I, no eryth  Extr: No edema, no calf tenderness, neg Hiro's sign    Path:  Reviewed w pt    Assessment   Post-operative state  (primary encounter diagnosis)    Doing well post op  Continue to keep incision clean and dry. Maintain a healthy diet. Maintain good hydration. F/u prn.          Stephani Gomes MD

## 2023-05-17 ENCOUNTER — TELEPHONE (OUTPATIENT)
Dept: INTERNAL MEDICINE CLINIC | Facility: CLINIC | Age: 53
End: 2023-05-17

## 2023-05-17 DIAGNOSIS — Z12.31 BREAST CANCER SCREENING BY MAMMOGRAM: Primary | ICD-10-CM

## 2023-05-17 DIAGNOSIS — Z12.4 SCREENING FOR CERVICAL CANCER: ICD-10-CM

## 2023-05-17 DIAGNOSIS — Z00.00 ADULT GENERAL MEDICAL EXAM: ICD-10-CM

## 2023-05-17 NOTE — TELEPHONE ENCOUNTER
Please instruct patient to complete mammography. I have not seen her for quite some time.   She is due for her physical.  Please inform her thank you

## 2023-05-18 NOTE — TELEPHONE ENCOUNTER
Appt. For her Physical Exam made for 6/29/2023. Please generate lab Orders for patient to complete before her Appt.      She will also complete her Mammogram.

## 2023-06-09 ENCOUNTER — HOSPITAL ENCOUNTER (OUTPATIENT)
Dept: MAMMOGRAPHY | Age: 53
Discharge: HOME | End: 2023-06-09
Attending: INTERNAL MEDICINE
Payer: COMMERCIAL

## 2023-06-09 DIAGNOSIS — Z12.31 BREAST CANCER SCREENING BY MAMMOGRAM: ICD-10-CM

## 2023-06-09 PROCEDURE — 77067 SCR MAMMO BI INCL CAD: CPT | Performed by: INTERNAL MEDICINE

## 2023-06-09 PROCEDURE — 77063 BREAST TOMOSYNTHESIS BI: CPT | Performed by: INTERNAL MEDICINE

## 2023-06-27 ENCOUNTER — LAB ENCOUNTER (OUTPATIENT)
Dept: LAB | Age: 53
End: 2023-06-27
Attending: INTERNAL MEDICINE
Payer: COMMERCIAL

## 2023-06-27 LAB
ALBUMIN SERPL-MCNC: 3.8 G/DL (ref 3.4–5)
ALBUMIN/GLOB SERPL: 1.2 {RATIO} (ref 1–2)
ALP LIVER SERPL-CCNC: 67 U/L
ALT SERPL-CCNC: 31 U/L
ANION GAP SERPL CALC-SCNC: 4 MMOL/L (ref 0–18)
AST SERPL-CCNC: 27 U/L (ref 15–37)
BILIRUB SERPL-MCNC: 0.6 MG/DL (ref 0.1–2)
BUN BLD-MCNC: 20 MG/DL (ref 7–18)
CALCIUM BLD-MCNC: 9 MG/DL (ref 8.5–10.1)
CHLORIDE SERPL-SCNC: 110 MMOL/L (ref 98–112)
CHOLEST SERPL-MCNC: 200 MG/DL (ref ?–200)
CO2 SERPL-SCNC: 26 MMOL/L (ref 21–32)
CREAT BLD-MCNC: 1.01 MG/DL
ERYTHROCYTE [DISTWIDTH] IN BLOOD BY AUTOMATED COUNT: 13.5 %
FASTING PATIENT LIPID ANSWER: YES
FASTING STATUS PATIENT QL REPORTED: YES
GFR SERPLBLD BASED ON 1.73 SQ M-ARVRAT: 66 ML/MIN/1.73M2 (ref 60–?)
GLOBULIN PLAS-MCNC: 3.3 G/DL (ref 2.8–4.4)
GLUCOSE BLD-MCNC: 87 MG/DL (ref 70–99)
HCT VFR BLD AUTO: 41.9 %
HDLC SERPL-MCNC: 57 MG/DL (ref 40–59)
HGB BLD-MCNC: 13.8 G/DL
LDLC SERPL CALC-MCNC: 117 MG/DL (ref ?–100)
MCH RBC QN AUTO: 29.4 PG (ref 26–34)
MCHC RBC AUTO-ENTMCNC: 32.9 G/DL (ref 31–37)
MCV RBC AUTO: 89.1 FL
NONHDLC SERPL-MCNC: 143 MG/DL (ref ?–130)
OSMOLALITY SERPL CALC.SUM OF ELEC: 292 MOSM/KG (ref 275–295)
PLATELET # BLD AUTO: 282 10(3)UL (ref 150–450)
POTASSIUM SERPL-SCNC: 4.2 MMOL/L (ref 3.5–5.1)
PROT SERPL-MCNC: 7.1 G/DL (ref 6.4–8.2)
RBC # BLD AUTO: 4.7 X10(6)UL
SODIUM SERPL-SCNC: 140 MMOL/L (ref 136–145)
TRIGL SERPL-MCNC: 147 MG/DL (ref 30–149)
TSI SER-ACNC: 1.81 MIU/ML (ref 0.36–3.74)
VLDLC SERPL CALC-MCNC: 26 MG/DL (ref 0–30)
WBC # BLD AUTO: 6.4 X10(3) UL (ref 4–11)

## 2023-06-27 PROCEDURE — 84443 ASSAY THYROID STIM HORMONE: CPT | Performed by: INTERNAL MEDICINE

## 2023-06-27 PROCEDURE — 83036 HEMOGLOBIN GLYCOSYLATED A1C: CPT | Performed by: INTERNAL MEDICINE

## 2023-06-27 PROCEDURE — 36415 COLL VENOUS BLD VENIPUNCTURE: CPT | Performed by: INTERNAL MEDICINE

## 2023-06-27 PROCEDURE — 85027 COMPLETE CBC AUTOMATED: CPT | Performed by: INTERNAL MEDICINE

## 2023-06-27 PROCEDURE — 80061 LIPID PANEL: CPT | Performed by: INTERNAL MEDICINE

## 2023-06-27 PROCEDURE — 80053 COMPREHEN METABOLIC PANEL: CPT | Performed by: INTERNAL MEDICINE

## 2023-06-28 LAB
EST. AVERAGE GLUCOSE BLD GHB EST-MCNC: 120 MG/DL (ref 68–126)
HBA1C MFR BLD: 5.8 % (ref ?–5.7)

## 2024-09-06 NOTE — ADDENDUM NOTE
Addended by: Zeeshan Navas on: 1/17/2022 09:16 AM     Modules accepted: Orders Quality 431: Preventive Care And Screening: Unhealthy Alcohol Use - Screening: Patient not identified as an unhealthy alcohol user when screened for unhealthy alcohol use using a systematic screening method Detail Level: Detailed Quality 226: Preventive Care And Screening: Tobacco Use: Screening And Cessation Intervention: Patient screened for tobacco use, is a smoker AND received Cessation Counseling within measurement period or in the six months prior to the measurement period

## 2024-09-10 ENCOUNTER — TELEPHONE (OUTPATIENT)
Dept: ORTHOPEDICS CLINIC | Facility: CLINIC | Age: 54
End: 2024-09-10

## 2024-09-10 ENCOUNTER — OFFICE VISIT (OUTPATIENT)
Dept: INTERNAL MEDICINE CLINIC | Facility: CLINIC | Age: 54
End: 2024-09-10
Payer: COMMERCIAL

## 2024-09-10 VITALS
HEART RATE: 82 BPM | SYSTOLIC BLOOD PRESSURE: 120 MMHG | BODY MASS INDEX: 27.32 KG/M2 | DIASTOLIC BLOOD PRESSURE: 64 MMHG | RESPIRATION RATE: 14 BRPM | WEIGHT: 154.19 LBS | TEMPERATURE: 99 F | HEIGHT: 63 IN | OXYGEN SATURATION: 97 %

## 2024-09-10 DIAGNOSIS — M79.641 RIGHT HAND PAIN: ICD-10-CM

## 2024-09-10 DIAGNOSIS — Z00.00 ROUTINE PHYSICAL EXAMINATION: Primary | ICD-10-CM

## 2024-09-10 DIAGNOSIS — D17.0 LIPOMA OF FACE: ICD-10-CM

## 2024-09-10 DIAGNOSIS — H93.8X2 CONGESTION OF LEFT EAR: ICD-10-CM

## 2024-09-10 DIAGNOSIS — Z12.31 ENCOUNTER FOR SCREENING MAMMOGRAM FOR MALIGNANT NEOPLASM OF BREAST: ICD-10-CM

## 2024-09-10 DIAGNOSIS — R04.0 EPISTAXIS: ICD-10-CM

## 2024-09-10 PROBLEM — D17.9 LIPOMA: Status: RESOLVED | Noted: 2020-09-09 | Resolved: 2024-09-10

## 2024-09-10 PROBLEM — H52.03 HYPEROPIA OF BOTH EYES WITH ASTIGMATISM AND PRESBYOPIA: Status: RESOLVED | Noted: 2020-08-05 | Resolved: 2024-09-10

## 2024-09-10 PROBLEM — H52.203 HYPEROPIA OF BOTH EYES WITH ASTIGMATISM AND PRESBYOPIA: Status: RESOLVED | Noted: 2020-08-05 | Resolved: 2024-09-10

## 2024-09-10 PROBLEM — H52.4 HYPEROPIA OF BOTH EYES WITH ASTIGMATISM AND PRESBYOPIA: Status: RESOLVED | Noted: 2020-08-05 | Resolved: 2024-09-10

## 2024-09-10 PROBLEM — H25.13 AGE-RELATED NUCLEAR CATARACT OF BOTH EYES: Status: RESOLVED | Noted: 2020-08-05 | Resolved: 2024-09-10

## 2024-09-10 PROCEDURE — 99203 OFFICE O/P NEW LOW 30 MIN: CPT | Performed by: INTERNAL MEDICINE

## 2024-09-10 PROCEDURE — 99386 PREV VISIT NEW AGE 40-64: CPT | Performed by: INTERNAL MEDICINE

## 2024-09-10 NOTE — TELEPHONE ENCOUNTER
Patient already has a order in and schedule from a different provider for her right hand If xrays is not completed by her appt with Dr.Patel THRASHER on 11.8.24 imaging will be needed and the order will be placed and scheduled. Thanks

## 2024-09-10 NOTE — TELEPHONE ENCOUNTER
Patient is seeing Dr. Downey for right hand pain. Please advise if imaging is needed.  Future Appointments   Date Time Provider Department Center   9/11/2024  7:15 AM [facility] LABTECHS [facility] [place]   9/11/2024  2:30 PM [facility] XR RM1 [facility] XRAY [place]   11/8/2024  9:45 AM [facility] AUDIO [facility] [id number]   11/8/2024 10:00 AM Juan F Rizvi MD EM[facility]OTONAPER [id number]   11/8/2024  2:30 PM [id number]

## 2024-09-10 NOTE — PROGRESS NOTES
Patient Office Visit    ASSESSMENT AND PLAN:   1. Routine physical examination  Note: Continue to exercise at least 150 minutes a week and Eat a plant based diet. Please take 2000 IU of vitamin D daily for life to keep your bones strong. Please see your dentist every 6 months.  Continue with regular eye exams.  Declines all vaccines.  She will follow-up with the gynecologist for a Pap  - ALT(SGPT)  - AST (SGOT)  - Basic Metabolic Panel (8)  - Lipid Panel  - CBC With Differential With Platelet  - TSH W Reflex To Free T4  - Hemoglobin A1C  - OBG Referral - In Network    2. Encounter for screening mammogram for malignant neoplasm of breast  - [facility] BRETT 2D+3D SCREENING BILAT (CPT=77067/27135); Future    3. Epistaxis  Note: Referral provided-recommended to use Vaseline and humidifier nightly  - ENT Referral - In Network    4. Congestion of left ear  - ENT Referral - In Network    5. Lipoma of face  - Plastic Surgery Referral - In Network    6. Right hand pain  Note: Discussed with patient that this could be carpal tunnel and to try a wrist brace.  Patient would like to see the specialist soon.  Referral provided  - Ortho Referral - In Network  - XR HAND (MIN 3 VIEWS), RIGHT (CPT=73130); Future      Return to clinic yearly for routine physical    Patient/Caregiver Education: Patient/Caregiver Education: There are no barriers to learning. Medical education done. Outcome: Patient verbalizes understanding. Patient is notified to call with any questions, complications, allergies, or worsening or changing symptoms.  Patient is to call with any side effects or complications from the treatments as a result of today.      Reviewed Past Medical History and   Patient Active Problem List   Diagnosis    Varicose veins of both lower extremities with complications    Hemangioma       Orders Placed This Encounter   Procedures    ALT(SGPT)    AST (SGOT)    Basic Metabolic Panel (8)    Lipid Panel    CBC With Differential With Platelet     TSH W Reflex To Free T4    Hemoglobin A1C     Requested Prescriptions      No prescriptions requested or ordered in this encounter         Kenya Palma DO  CC:  Chief Complaint   Patient presents with    Physical         HPI:   Reginald Zimmerman is a 54-year-old female who presents for routine physical and to discuss the following concerns    Lipoma: She had it removed a few years ago through a plastic surgeon.  She had it removed first with her general surgeon but it came back so she was referred to plastics.  She feels that the 1 on the face is coming back again and would like to see the specialist  Right nostril bleeding with left ear congestion: Has been going on for the past few years.  When she looks down she feels that her ear gets congested and painful.  The bleeding from the right nostril started after her lipoma surgery.  The bleeding worsens when she is taking a hot shower.  She uses a humidifier nightly and avoids any nasal sprays.  Varicose veins: She has been to the vein clinic but the treatment is generally temporary and painful.  She is wondering what else she can do about the veins  Right hand numbness: She is right-handed and the numbness and pain has been going on for several years.  She feels weak at times but she does not feel any pain in the neck.  She is interested in seeing the specialist     Past Medical History:    Anemia    Calculus of kidney    Headache disorder    headaches    Lipid screening    Per [employer]    Nephrolithiasis    Sinusitis    Skin lesion    Vertigo    Visual impairment    readers       Past Surgical History:   Procedure Laterality Date    Exc skin benig 1.1-2cm remaindr body Right     Exc mass R neck    Skin surgery Right 01/18/2020    flank       Social History:  Social History     Socioeconomic History    Marital status:    Tobacco Use    Smoking status: Former     Current packs/day: 0.00     Average packs/day: 0.5 packs/day for 15.0 years (7.5 ttl  pk-yrs)     Types: Cigarettes     Start date: 10/22/2005     Quit date: 10/22/2020     Years since quitting: 3.8    Smokeless tobacco: Never    Tobacco comments:     1 pack in a week   Vaping Use    Vaping status: Never Used   Substance and Sexual Activity    Alcohol use: No     Alcohol/week: 0.0 standard drinks of alcohol    Drug use: No    Sexual activity: Yes     Partners: Male   Other Topics Concern    Caffeine Concern Yes     Comment: (Coffee, Tea) 2 cups daily    Left Handed No    Right Handed Yes    Currently spends a great deal of time in the sun No    History of tanning No    Hx of Spending Great Deal of Time in Sun No    Bad sunburns in the past Yes    Tanning Salons in the Past No    Hx of Radiation Treatments No     Family History:  Family History   Problem Relation Age of Onset    Hypertension Father     Heart Disease Mother         CAD    Hypertension Mother     Diabetes Neg     Glaucoma Neg     Macular degeneration Neg      Allergies:  Allergies   Allergen Reactions    Methylprednisolone SHORTNESS OF BREATH and OTHER (SEE COMMENTS)     Redness/swelling to face,chest then SOB       Current Meds:  Current Outpatient Medications on File Prior to Visit   Medication Sig Dispense Refill    HYDROcodone-acetaminophen 5-325 MG Oral Tab Take 1 tablet by mouth every 6 (six) hours as needed for Pain. (Patient not taking: Reported on 9/10/2024) 6 tablet 0    cholecalciferol 50 MCG (2000 UT) Oral Cap Take 1 capsule (2,000 Units total) by mouth daily. (Patient not taking: Reported on 9/10/2024)       No current facility-administered medications on file prior to visit.         REVIEW OF SYSTEMS   Constitutional: no fatigue normal energy no weight changes   HENT: as above   Eyes: . normal vision no eye pain   Respiratory: normal respirations no cough   Cardiovascular: no CP, or palpitations   Gastrointestinal: normal bowels and no abd pains   Genitourinary:  normal urination no hematuria, no frequency    Musculoskeletal: as above   Skin: no rashes or skin lesions that are new   Neurological:  no weakness, no numbness, normal gait   Hematological:  no bruises or bleeding   Psychiatric/Behavioral: normal mood no anxiety normal behavior     /64 (BP Location: Left arm, Patient Position: Sitting, Cuff Size: adult)   Pulse 82   Temp 98.8 °F (37.1 °C) (Temporal)   Resp 14   Ht 5' 3\" (1.6 m)   Wt 154 lb 3.2 oz (69.9 kg)   SpO2 97%   BMI 27.32 kg/m²     PHYSICAL EXAM:   Constitutional: Vital signs reviewed as noted, well developed, in no acute distress.   HENT: NCAT, some cerumen noted in the left ear however right ear canal is normal  Eyes: pupils reactive bilaterally  Neck: No thyroidmegaly  Cardiovascular: nl s1 s2 no m/r/g  Pulmonary/Chest: CTA bilaterally with no wheezes  Abdominal: Soft NT normal Bowel sounds  Musculoskeletal: Heberden's and Tangela's nodes noted on the right hand.  Full range of motion of the right hand, sensation intact to light touch  Extremities: no pedal edema   Neurological:  no weakness in UE and LE, reflexes are normal  Skin: Slight scarring and a lump noted on the right side of the forehead  Psychiatric:normal mood

## 2024-09-10 NOTE — PATIENT INSTRUCTIONS
Continue to exercise at least 150 minutes a week and Eat a plant based diet     Please take 2000 IU of vitamin D daily for life to keep your bones strong    Please see your dentist every 6 months  Continue with regular eye exams    I have ordered blood tests for you    I have ordered a mammogram    I have referred you to the following doctors:  Plastics (Dr. Olivares)  ENT (Dr. Rizvi)  Hand (Dr. Downey)  Gynecologist     I have ordered an x ray for you    I highly recommend the shingles vaccine    See me yearly for a physical

## 2024-09-11 ENCOUNTER — HOSPITAL ENCOUNTER (OUTPATIENT)
Dept: GENERAL RADIOLOGY | Age: 54
Discharge: HOME | End: 2024-09-11
Attending: INTERNAL MEDICINE
Payer: COMMERCIAL

## 2024-09-11 ENCOUNTER — LAB ENCOUNTER (OUTPATIENT)
Dept: LAB | Age: 54
End: 2024-09-11
Attending: INTERNAL MEDICINE
Payer: COMMERCIAL

## 2024-09-11 ENCOUNTER — TELEPHONE (OUTPATIENT)
Dept: INTERNAL MEDICINE CLINIC | Facility: CLINIC | Age: 54
End: 2024-09-11

## 2024-09-11 DIAGNOSIS — D18.00 HEMANGIOMA, UNSPECIFIED SITE: Primary | ICD-10-CM

## 2024-09-11 DIAGNOSIS — D17.0 LIPOMA OF FACE: ICD-10-CM

## 2024-09-11 DIAGNOSIS — M79.641 RIGHT HAND PAIN: ICD-10-CM

## 2024-09-11 LAB
ALT SERPL-CCNC: 17 U/L
ANION GAP SERPL CALC-SCNC: 5 MMOL/L (ref 0–18)
AST SERPL-CCNC: 18 U/L (ref ?–34)
BASOPHILS # BLD AUTO: 0.1 X10(3) UL (ref 0–0.2)
BASOPHILS NFR BLD AUTO: 1.6 %
BUN BLD-MCNC: 16 MG/DL (ref 9–23)
CALCIUM BLD-MCNC: 9.9 MG/DL (ref 8.7–10.4)
CHLORIDE SERPL-SCNC: 109 MMOL/L (ref 98–112)
CHOLEST SERPL-MCNC: 158 MG/DL (ref ?–200)
CO2 SERPL-SCNC: 28 MMOL/L (ref 21–32)
CREAT BLD-MCNC: 0.88 MG/DL
EGFRCR SERPLBLD CKD-EPI 2021: 77 ML/MIN/1.73M2 (ref 60–?)
EOSINOPHIL # BLD AUTO: 0.25 X10(3) UL (ref 0–0.7)
EOSINOPHIL NFR BLD AUTO: 3.9 %
ERYTHROCYTE [DISTWIDTH] IN BLOOD BY AUTOMATED COUNT: 13.8 %
EST. AVERAGE GLUCOSE BLD GHB EST-MCNC: 123 MG/DL (ref 68–126)
FASTING PATIENT LIPID ANSWER: YES
FASTING STATUS PATIENT QL REPORTED: YES
GLUCOSE BLD-MCNC: 95 MG/DL (ref 70–99)
HBA1C MFR BLD: 5.9 % (ref ?–5.7)
HCT VFR BLD AUTO: 41.3 %
HDLC SERPL-MCNC: 48 MG/DL (ref 40–59)
HGB BLD-MCNC: 13.4 G/DL
IMM GRANULOCYTES # BLD AUTO: 0.02 X10(3) UL (ref 0–1)
IMM GRANULOCYTES NFR BLD: 0.3 %
LDLC SERPL CALC-MCNC: 85 MG/DL (ref ?–100)
LYMPHOCYTES # BLD AUTO: 2.63 X10(3) UL (ref 1–4)
LYMPHOCYTES NFR BLD AUTO: 41 %
MCH RBC QN AUTO: 29.1 PG (ref 26–34)
MCHC RBC AUTO-ENTMCNC: 32.4 G/DL (ref 31–37)
MCV RBC AUTO: 89.8 FL
MONOCYTES # BLD AUTO: 0.58 X10(3) UL (ref 0.1–1)
MONOCYTES NFR BLD AUTO: 9 %
NEUTROPHILS # BLD AUTO: 2.84 X10 (3) UL (ref 1.5–7.7)
NEUTROPHILS # BLD AUTO: 2.84 X10(3) UL (ref 1.5–7.7)
NEUTROPHILS NFR BLD AUTO: 44.2 %
NONHDLC SERPL-MCNC: 110 MG/DL (ref ?–130)
OSMOLALITY SERPL CALC.SUM OF ELEC: 295 MOSM/KG (ref 275–295)
PLATELET # BLD AUTO: 270 10(3)UL (ref 150–450)
POTASSIUM SERPL-SCNC: 4.2 MMOL/L (ref 3.5–5.1)
RBC # BLD AUTO: 4.6 X10(6)UL
SODIUM SERPL-SCNC: 142 MMOL/L (ref 136–145)
TRIGL SERPL-MCNC: 141 MG/DL (ref 30–149)
TSI SER-ACNC: 1.77 MIU/ML (ref 0.55–4.78)
VLDLC SERPL CALC-MCNC: 22 MG/DL (ref 0–30)
WBC # BLD AUTO: 6.4 X10(3) UL (ref 4–11)

## 2024-09-11 PROCEDURE — 73130 X-RAY EXAM OF HAND: CPT | Performed by: INTERNAL MEDICINE

## 2024-09-11 PROCEDURE — 85025 COMPLETE CBC W/AUTO DIFF WBC: CPT | Performed by: INTERNAL MEDICINE

## 2024-09-11 PROCEDURE — 84443 ASSAY THYROID STIM HORMONE: CPT | Performed by: INTERNAL MEDICINE

## 2024-09-11 PROCEDURE — 84460 ALANINE AMINO (ALT) (SGPT): CPT | Performed by: INTERNAL MEDICINE

## 2024-09-11 PROCEDURE — 80048 BASIC METABOLIC PNL TOTAL CA: CPT | Performed by: INTERNAL MEDICINE

## 2024-09-11 PROCEDURE — 83036 HEMOGLOBIN GLYCOSYLATED A1C: CPT | Performed by: INTERNAL MEDICINE

## 2024-09-11 PROCEDURE — 84450 TRANSFERASE (AST) (SGOT): CPT | Performed by: INTERNAL MEDICINE

## 2024-09-11 PROCEDURE — 36415 COLL VENOUS BLD VENIPUNCTURE: CPT | Performed by: INTERNAL MEDICINE

## 2024-09-11 PROCEDURE — 80061 LIPID PANEL: CPT | Performed by: INTERNAL MEDICINE

## 2024-09-11 NOTE — TELEPHONE ENCOUNTER
Patient came into office stating that she contacted the plastic surgeon she was referred to and was informed that they cannot see her due to change in procedure and was told she needs a dermatologist. Patient is requesting recommendations for this specialty. Please call back and advise.

## 2024-09-16 NOTE — PROGRESS NOTES
[facility]  Obstetrics and Gynecology   History & Physical  New Patient    Chief complaint:   Chief Complaint   Patient presents with    Wellness Visit     New Patient- Annual Gyn Exam          Subjective:     HPI: Reginald Zimmerman is a 54 year old  with No LMP recorded. (Menstrual status: Menopause).     Here for WWE.  Complaints: just increased frequency of urination with drinking coffee and tea. We discussed caffeine can cause this.      Due for pap. Has mammogram scheduled for . UTD on colonoscopy. Is sexually active with no concerns. Declines STD testing.     Has been in menopause for 2 years. No postmenopausal bleeding. No menopause symptoms    Chaperone for exam was declined.    PCP: Jerzy Candelario MD   Patient Care Team:  Jerzy Candelario MD as PCP - General (Internal Medicine)       GYN Hx:    No LMP recorded. (Menstrual status: Menopause).  Hx Prior Abnormal Pap: No  Pap Date: 19  Pap Result Notes: Negative    Sexually active? yes  Dyspareunia? no  Postcoital bleeding? No     Contraception: menopause   STDs: denies  HPV vaccine: no     Cervical cancer screening:   History of colposcopy? no  History of cryosurgery? no  History of LEEP/conization? no  Last pap: 2019 NILM, HPV negative. Denies abnormal     Breast cancer screening:  Mammogram: 2023: BIRADS 2 - has next one scheduled      Colon cancer screening:  Colonoscopy: completed  - next due 2028     Osteoporosis screening:  DEXA scan age 62 yo      OB History:  OB History    Para Term  AB Living   3 2 2   1 2   SAB IAB Ectopic Multiple Live Births     1     2      # Outcome Date GA Lbr Ab/2nd Weight Sex Type Anes PTL Lv   3 IAB            2 Term      NORMAL SPONT   [facility]   1 Term      NORMAL SPONT   [facility]       Meds:  Current Outpatient Medications on File Prior to Visit   Medication Sig Dispense Refill    HYDROcodone-acetaminophen 5-325 MG Oral Tab Take 1 tablet by mouth every 6 (six) hours as  needed for Pain. (Patient not taking: Reported on 9/10/2024) 6 tablet 0    cholecalciferol 50 MCG (2000 UT) Oral Cap Take 1 capsule (2,000 Units total) by mouth daily. (Patient not taking: Reported on 9/10/2024)       No current facility-administered medications on file prior to visit.       All:  Allergies   Allergen Reactions    Methylprednisolone SHORTNESS OF BREATH and OTHER (SEE COMMENTS)     Redness/swelling to face,chest then SOB         PMH:  Past Medical History:   Diagnosis Date    Anemia 2013    Calculus of kidney     Headache disorder     headaches    Lipid screening 10/17/2013    Per [facility]    Nephrolithiasis 2009    Sinusitis 2011    Skin lesion 11-1-16    Vertigo     Visual impairment     readers        PSH:  Past Surgical History:   Procedure Laterality Date    Exc skin benig 1.1-2cm remaindr body Right     Exc mass R neck    Skin surgery Right 01/18/2020    flank       Social History:  Social History     Socioeconomic History    Marital status:    Tobacco Use    Smoking status: Former     Current packs/day: 0.00     Average packs/day: 0.5 packs/day for 15.0 years (7.5 ttl pk-yrs)     Types: Cigarettes     Start date: 10/22/2005     Quit date: 10/22/2020     Years since quitting: 3.9    Smokeless tobacco: Never    Tobacco comments:     1 pack in a week   Vaping Use    Vaping status: Never Used   Substance and Sexual Activity    Alcohol use: No    Drug use: No    Sexual activity: Yes     Partners: Male   Other Topics Concern    Caffeine Concern Yes    Stress Concern No    Weight Concern No    Special Diet No    Exercise No    Seat Belt Yes    Left Handed No    Right Handed Yes    Currently spends a great deal of time in the sun No    History of tanning No    Hx of Spending Great Deal of Time in Sun No    Bad sunburns in the past Yes    Tanning Salons in the Past No    Hx of Radiation Treatments No        Family History:  Family History   Problem Relation Age of Onset    Hypertension Father      Heart Disease Mother         CAD    Hypertension Mother     Diabetes Neg     Glaucoma Neg     Macular degeneration Neg        Immunization History:    There is no immunization history on file for this patient.    Depression Scale      PHQ-2 SCORE: 0  , done 9/10/2024   Last [place] Suicide Screening on 9/17/2024 was No Risk.      Constitutional:  Denies fatigue, night sweats, hot flashes  Eyes:  denies blurred or double vision  Cardiovascular:  denies chest pain or palpitations  Respiratory:  denies shortness of breath  Gastrointestinal:  denies heartburn, abdominal pain, diarrhea or constipation  Genitourinary:  denies dysuria, incontinence, abnormal vaginal discharge, vaginal itching  Musculoskeletal:  denies back pain.  Skin/Breast:  Denies any breast pain, lumps, or discharge.   Neurological:  denies headaches, extremity weakness or numbness.  Psychiatric: denies depression or anxiety.  Endocrine:   denies excessive thirst or urination.  Heme/Lymph:  denies history of anemia, easy bruising or bleeding.    Objective:     Vitals:    09/17/24 0853   BP: 124/81   Pulse: 75   Weight: 152 lb 3.2 oz (69 kg)   Height: 63\"       Body mass index is 26.96 kg/m².    Physical Exam:  Constitutional: well developed, well nourished  Head/Face: normocephalic  Lymphatic: no abnormal supraclavicular or axillary adenopathy is noted  Breast: normal without palpable masses, tenderness, asymmetry, nipple discharge, nipple retraction or skin changes  Abdomen:  soft, nontender, nondistended, no masses  Skin/Hair: no unusual rashes or bruises  Extremities: no edema, no cyanosis  Psychiatric:  Oriented to time, place, person and situation. Appropriate mood and affect    Pelvic Exam:  External Genitalia: normal appearance, hair distribution, and no lesions  Urethral Meatus:  normal in size, location, without lesions and prolapse  Bladder:  No fullness, masses or tenderness  Vagina:  Normal appearance without lesions, no abnormal  discharge  Cervix:  Normal without tenderness on motion  Uterus: normal in size, contour, position, mobility, without tenderness  Adnexa: normal without masses or tenderness  Perineum: normal  Anus: no hemorrhoids     Labs:  Lab Results   Component Value Date    WBC 6.4 09/11/2024    RBC 4.60 09/11/2024    HGB 13.4 09/11/2024    HCT 41.3 09/11/2024    MCV 89.8 09/11/2024    MCH 29.1 09/11/2024    MCHC 32.4 09/11/2024    RDW 13.8 09/11/2024    .0 09/11/2024    MPV 8.2 10/17/2013        Lab Results   Component Value Date    GLU 95 09/11/2024    BUN 16 09/11/2024    BUNCREA 22.5 (H) 10/30/2020    CREATSERUM 0.88 09/11/2024    ANIONGAP 5 09/11/2024    GFRNAA 98 10/30/2020    GFRAA 113 10/30/2020    CA 9.9 09/11/2024    OSMOCALC 295 09/11/2024    ALKPHO 67 06/27/2023    AST 18 09/11/2024    ALT 17 09/11/2024    ALKPHOS 41 10/17/2013    BILT 0.6 06/27/2023    TP 7.1 06/27/2023    ALB 3.8 06/27/2023    GLOBULIN 3.3 06/27/2023    AGRATIO 1.6 09/10/2016     09/11/2024    K 4.2 09/11/2024     09/11/2024    CO2 28.0 09/11/2024       Lab Results   Component Value Date    CHOLEST 158 09/11/2024    TRIG 141 09/11/2024    HDL 48 09/11/2024    LDL 85 09/11/2024    VLDL 22 09/11/2024    TCHDLRATIO 3.0 09/10/2016    NONHDLC 110 09/11/2024    CALCNONHDL 111 10/17/2013        Lab Results   Component Value Date    TSH 1.767 09/11/2024        Lab Results   Component Value Date     09/11/2024    A1C 5.9 (H) 09/11/2024       Imaging:  XR HAND (MIN 3 VIEWS), RIGHT (CPT=73130)    Result Date: 9/11/2024  CONCLUSION:  1. Mild joint space narrowing between the scaphoid and trapezium and between the scaphoid and trapezoid.  No focal bony destructive changes or erosions are seen. 2. Minimal osteoarthritic changes involving the interphalangeal joint of the thumb.   LOCATION:  [facility]   Dictated by (CST): Shivam Perdomo MD on 9/11/2024 at 3:02 PM     Finalized by (CST): Shivam Perdomo MD on 9/11/2024 at 3:04 PM          Assessment and Plan:     Reginald Zimmerman is a 54 year old  female here for well woman exam.      Diagnoses and all orders for this visit:    Encounter for well woman exam with routine gynecological exam  -     ThinPrep PAP Smear B; Future  -     Hpv High Risk , Thin Prep Collect; Future    Cervical cancer screening  -     ThinPrep PAP Smear B; Future  -     Hpv High Risk , Thin Prep Collect; Future       Cervical cancer screening  - Last pap smear: 2019 NILM, HPV neg   - ASCCP guidelines reviewed  - Plan to repeat pap smear today     Health maintenance:  Breast and pelvic exam WNL  HPV vaccine: n/a  Contraception: menopause  Plans for kids: no  STD screening: declines   Breast exam: benign  Mammogram: scheduled    Colonoscopy: next due 2028   DEXA: 64 yo  Concern for hereditary cancer? no   BMI 27  -healthy diet & exercise encouraged    RTC 1 year for well woman exam or sooner if needed    Kathy Cortez MD  [facility] - OBGYN    Note to patient and family:  The  [place] Cures Act makes medical notes available to patients in the interest of transparency.  However, please be advised that this is a medical document.  It is intended as a peer to peer communication.  It is written in medical language and may contain abbreviations or verbiage that are technical and unfamiliar.  It may appear blunt or direct.  Medical documents are intended to carry relevant information, facts as evident, and the clinical opinion of the practitioner.

## 2024-09-17 ENCOUNTER — OFFICE VISIT (OUTPATIENT)
Dept: OBGYN CLINIC | Facility: CLINIC | Age: 54
End: 2024-09-17
Payer: COMMERCIAL

## 2024-09-17 VITALS
DIASTOLIC BLOOD PRESSURE: 81 MMHG | SYSTOLIC BLOOD PRESSURE: 124 MMHG | HEART RATE: 75 BPM | HEIGHT: 63 IN | BODY MASS INDEX: 26.96 KG/M2 | WEIGHT: 152.19 LBS

## 2024-09-17 DIAGNOSIS — Z01.419 ENCOUNTER FOR WELL WOMAN EXAM WITH ROUTINE GYNECOLOGICAL EXAM: Primary | ICD-10-CM

## 2024-09-17 DIAGNOSIS — Z12.4 CERVICAL CANCER SCREENING: ICD-10-CM

## 2024-09-17 PROCEDURE — 87624 HPV HI-RISK TYP POOLED RSLT: CPT | Performed by: STUDENT IN AN ORGANIZED HEALTH CARE EDUCATION/TRAINING PROGRAM

## 2024-09-17 PROCEDURE — 99386 PREV VISIT NEW AGE 40-64: CPT | Performed by: STUDENT IN AN ORGANIZED HEALTH CARE EDUCATION/TRAINING PROGRAM

## 2024-09-17 PROCEDURE — 88175 CYTOPATH C/V AUTO FLUID REDO: CPT | Performed by: STUDENT IN AN ORGANIZED HEALTH CARE EDUCATION/TRAINING PROGRAM

## 2024-09-18 ENCOUNTER — HOSPITAL ENCOUNTER (OUTPATIENT)
Dept: MAMMOGRAPHY | Age: 54
Discharge: HOME | End: 2024-09-18
Attending: INTERNAL MEDICINE
Payer: COMMERCIAL

## 2024-09-18 DIAGNOSIS — Z12.31 ENCOUNTER FOR SCREENING MAMMOGRAM FOR MALIGNANT NEOPLASM OF BREAST: ICD-10-CM

## 2024-09-18 LAB — HPV E6+E7 MRNA CVX QL NAA+PROBE: NEGATIVE

## 2024-09-18 PROCEDURE — 77063 BREAST TOMOSYNTHESIS BI: CPT | Performed by: INTERNAL MEDICINE

## 2024-09-18 PROCEDURE — 77067 SCR MAMMO BI INCL CAD: CPT | Performed by: INTERNAL MEDICINE

## 2024-09-19 DIAGNOSIS — R92.30 DENSE BREAST: Primary | ICD-10-CM

## 2024-09-24 LAB
.: NORMAL
.: NORMAL

## 2024-11-08 ENCOUNTER — OFFICE VISIT (OUTPATIENT)
Facility: LOCATION | Age: 54
End: 2024-11-08
Payer: COMMERCIAL

## 2024-11-08 ENCOUNTER — OFFICE VISIT (OUTPATIENT)
Dept: ORTHOPEDICS CLINIC | Facility: CLINIC | Age: 54
End: 2024-11-08
Payer: COMMERCIAL

## 2024-11-08 VITALS — BODY MASS INDEX: 26.93 KG/M2 | HEIGHT: 63 IN | WEIGHT: 152 LBS

## 2024-11-08 DIAGNOSIS — H90.72 MIXED CONDUCTIVE AND SENSORINEURAL HEARING LOSS OF LEFT EAR WITH UNRESTRICTED HEARING OF RIGHT EAR: ICD-10-CM

## 2024-11-08 DIAGNOSIS — G56.01 CARPAL TUNNEL SYNDROME OF RIGHT WRIST: Primary | ICD-10-CM

## 2024-11-08 DIAGNOSIS — R42 VERTIGO: ICD-10-CM

## 2024-11-08 DIAGNOSIS — H93.12 TINNITUS OF LEFT EAR: ICD-10-CM

## 2024-11-08 DIAGNOSIS — R04.0 EPISTAXIS: Primary | ICD-10-CM

## 2024-11-08 DIAGNOSIS — H93.292 ABNORMAL AUDITORY PERCEPTION OF LEFT EAR: Primary | ICD-10-CM

## 2024-11-08 PROCEDURE — 31231 NASAL ENDOSCOPY DX: CPT | Performed by: OTOLARYNGOLOGY

## 2024-11-08 PROCEDURE — 92557 COMPREHENSIVE HEARING TEST: CPT | Performed by: AUDIOLOGIST

## 2024-11-08 PROCEDURE — 99204 OFFICE O/P NEW MOD 45 MIN: CPT | Performed by: ORTHOPAEDIC SURGERY

## 2024-11-08 PROCEDURE — 99203 OFFICE O/P NEW LOW 30 MIN: CPT | Performed by: OTOLARYNGOLOGY

## 2024-11-08 PROCEDURE — 92567 TYMPANOMETRY: CPT | Performed by: AUDIOLOGIST

## 2024-11-08 RX ORDER — AMOXICILLIN 875 MG/1
875 TABLET, COATED ORAL 2 TIMES DAILY
Qty: 14 TABLET | Refills: 0 | Status: SHIPPED | OUTPATIENT
Start: 2024-11-08

## 2024-11-08 RX ORDER — DEXAMETHASONE 2 MG/1
2 TABLET ORAL 2 TIMES DAILY WITH MEALS
Qty: 14 TABLET | Refills: 0 | Status: SHIPPED | OUTPATIENT
Start: 2024-11-08

## 2024-11-08 NOTE — H&P
Clinic Note     Assessment/Plan:  54 year old female    Right carpal tunnel syndrome-her symptoms are predominately at nighttime with occasional daytime symptoms.  A 6-8-week course of nighttime bracing will likely improve if not resolve her symptoms.  If after 6 to 8 weeks of nighttime bracing she continues to have symptoms she will message me at which point we will order an EMG/NCV and follow-up with her after it is completed  Right index finger and thumb bone spur-minimally symptomatic observation was recommended.    Follow Up: As needed    Diagnostic Studies:     XR Right hand 3 views: Degenerative changes of the STT joint.  Mild degenerative changes of the CMC joint.       Physical Exam:     Ht 5' 3\" (1.6 m)   Wt 152 lb (68.9 kg)   BMI 26.93 kg/m²     Constitutional: NAD. AOx3. Well-developed and Well-nourished.   Psychiatric: Normal mood/ affect/ behavior. Judgment and thought content normal.     Right Upper Extremity:     Inspection    Skin intact.  Bone spur over the right index finger and thumb IP joint   Palpation    No significant tenderness to palpation      ROM    Full composite fist.     Neurovascular    Normal sensation in the median, ulnar, and radial nerve distribution. Normal motor function of muscles innervated by median/AIN, ulnar, and radial/PIN nerves.    Positive for Durkan.  No APB weakness    Normally perfused hand(s).     Special    None          CC: Right hand pain    HPI: This 54 year old RHD female presents with with pain starting in May 2024.  She localizes to the thumb and volar/dorsal right wrist.  She also complains of numbness, cramping, and spasms.  Pain is severe in intensity.  Patient's numbness    Occupation: Bakery tech    History/Other:   Past Medical History:    Anemia    Calculus of kidney    Headache disorder    headaches    Lipid screening    Per [facility]    Nephrolithiasis    Sinusitis    Skin lesion    Vertigo    Visual impairment    readers     Past Surgical  History:   Procedure Laterality Date    Exc skin benig 1.1-2cm remaindr body Right     Exc mass R neck    Skin surgery Right 2020    flank     Current Outpatient Medications   Medication Sig Dispense Refill    amoxicillin 875 MG Oral Tab Take 1 tablet (875 mg total) by mouth 2 (two) times daily. 14 tablet 0    dexamethasone 2 MG Oral Tab Take 1 tablet (2 mg total) by mouth 2 (two) times daily with meals. 14 tablet 0    HYDROcodone-acetaminophen 5-325 MG Oral Tab Take 1 tablet by mouth every 6 (six) hours as needed for Pain. (Patient not taking: Reported on 2024) 6 tablet 0    cholecalciferol 50 MCG (2000 UT) Oral Cap Take 1 capsule (2,000 Units total) by mouth daily. (Patient not taking: Reported on 2024)       Allergies[1]  Family History   Problem Relation Age of Onset    Hypertension Father     Heart Disease Mother         CAD    Hypertension Mother     Diabetes Neg     Glaucoma Neg     Macular degeneration Neg      Social History     Occupational History    Not on file   Tobacco Use    Smoking status: Former     Current packs/day: 0.00     Average packs/day: 0.5 packs/day for 15.0 years (7.5 ttl pk-yrs)     Types: Cigarettes     Start date: 10/22/2005     Quit date: 10/22/2020     Years since quittin.0    Smokeless tobacco: Never    Tobacco comments:     1 pack in a week   Vaping Use    Vaping status: Never Used   Substance and Sexual Activity    Alcohol use: No    Drug use: No    Sexual activity: Yes     Partners: Male          Review of Systems (negative unless bolded):  General: fevers, chills, fatigue  CV:  chest pain, palpitations, leg swelling  Msk: bodyaches, neck pain, neck stiffness  Skin: rashes, open wounds, nonhealing ulcers  Hem: bleeds easily, bruise easily, immunocompromised  Neuro: dizziness, light headedness, headaches  Psych: anxious, depressed, anger issues      Dayne Downey MD   Hand, Wrist, & Elbow Surgery  [email or web address]  t: [phone]  f: [phone]          [1]   Allergies  Allergen Reactions    Methylprednisolone SHORTNESS OF BREATH and OTHER (SEE COMMENTS)     Redness/swelling to face,chest then SOB

## 2024-11-08 NOTE — PROGRESS NOTES
Reginald Zimmerman was seen for an audiometric evaluation and tympanogram today. Referred back to physician.    Xochitl White MS, CCC-A

## 2024-11-08 NOTE — PROGRESS NOTES
Reginald Zimmerman is a 54 year old female.   Chief Complaint   Patient presents with    Ear Problem     HPI:   She has had congestion and decreased hearing in the left ear.  In  she had a fatty growth removed from her forehead and she states since then she has had some off-and-on epistaxis.  Current Outpatient Medications   Medication Sig Dispense Refill    amoxicillin 875 MG Oral Tab Take 1 tablet (875 mg total) by mouth 2 (two) times daily. 14 tablet 0    dexamethasone 2 MG Oral Tab Take 1 tablet (2 mg total) by mouth 2 (two) times daily with meals. 14 tablet 0    HYDROcodone-acetaminophen 5-325 MG Oral Tab Take 1 tablet by mouth every 6 (six) hours as needed for Pain. (Patient not taking: Reported on 9/10/2024) 6 tablet 0    cholecalciferol 50 MCG ( UT) Oral Cap Take 1 capsule (2,000 Units total) by mouth daily. (Patient not taking: Reported on 9/10/2024)        Past Medical History:    Anemia    Calculus of kidney    Headache disorder    headaches    Lipid screening    Per [facility]    Nephrolithiasis    Sinusitis    Skin lesion    Vertigo    Visual impairment    readers      Social History:  Social History     Socioeconomic History    Marital status:    Tobacco Use    Smoking status: Former     Current packs/day: 0.00     Average packs/day: 0.5 packs/day for 15.0 years (7.5 ttl pk-yrs)     Types: Cigarettes     Start date: 10/22/2005     Quit date: 10/22/2020     Years since quittin.0    Smokeless tobacco: Never    Tobacco comments:     1 pack in a week   Vaping Use    Vaping status: Never Used   Substance and Sexual Activity    Alcohol use: No    Drug use: No    Sexual activity: Yes     Partners: Male   Other Topics Concern    Caffeine Concern Yes    Stress Concern No    Weight Concern No    Special Diet No    Exercise No    Seat Belt Yes    Left Handed No    Right Handed Yes    Currently spends a great deal of time in the sun No    History of tanning No    Hx of Spending Great Deal of Time  in Sun No    Bad sunburns in the past Yes    Tanning Salons in the Past No    Hx of Radiation Treatments No      Past Surgical History:   Procedure Laterality Date    Exc skin benig 1.1-2cm remaindr body Right     Exc mass R neck    Skin surgery Right 01/18/2020    flank         REVIEW OF SYSTEMS:   GENERAL HEALTH: feels well otherwise  GENERAL : denies fever, chills, sweats, weight loss, weight gain  SKIN: denies any unusual skin lesions or rashes  RESPIRATORY: denies shortness of breath with exertion  NEURO: denies headaches    EXAM:   There were no vitals taken for this visit.    System Findings Details   Constitutional  Overall appearance - Normal.   Psychiatric  Orientation - Oriented to time, place, person & situation. Appropriate mood and affect.   Head/Face  Facial features -- Normal. Skull - Normal.   Eyes  Pupils equal ,round ,react to light and accomidate   Ears, Nose, Throat, Neck  Right ear clear left tympanic membrane is dull nose is fairly clear oropharynx clear neck no masses   Neurological  Memory - Normal. Cranial nerves - Cranial nerves II through XII grossly intact.   Lymph Detail  Submental. Submandibular. Anterior cervical. Posterior cervical. Supraclavicular.   Procedure:  Due to inability for adequate examination of the nasal cavity and nasopharynx and need for magnification to perform the examination, endoscopy was offered.  The nasal endoscope was utilized as it was imperative to inspect the interior of the nasal cavity and the middle and superior meatus along with the turbinates and sphenoethmoid recess.  Risks and benefits were discussed with patient/family and they have given consent to proceed. A rigid zero degree scope was inserted.    Findings:  The anterior of the nasal cavity along with the middle and superior meatus and turbinates and the sphenoethmoid recess were evaluated.  There is no significant blood vessels at this time.  No nasal polyps nasopharynx clear    ASSESSMENT AND  PLAN:   1. Epistaxis  She will continue with nasal saline.    2. Mixed conductive and sensorineural hearing loss of left ear with unrestricted hearing of right ear  She has fluid on her left ear.  I will not use Flonase as she has a history of epistaxis.  She will use Decadron and antibiotic.  If she is not better she may require an ear tube to the left ear.      The patient indicates understanding of these issues and agrees to the plan.    No follow-ups on file.    Juan F Rizvi MD  11/8/2024  11:26 AM

## 2024-12-17 ENCOUNTER — HOSPITAL ENCOUNTER (OUTPATIENT)
Dept: MAMMOGRAPHY | Facility: HOSPITAL | Age: 54
Discharge: HOME | End: 2024-12-17
Attending: INTERNAL MEDICINE
Payer: COMMERCIAL

## 2024-12-17 DIAGNOSIS — R92.30 DENSE BREAST: ICD-10-CM

## 2024-12-17 PROCEDURE — 76641 ULTRASOUND BREAST COMPLETE: CPT | Performed by: INTERNAL MEDICINE

## 2024-12-18 DIAGNOSIS — R92.30 DENSE BREAST: Primary | ICD-10-CM

## 2025-03-14 ENCOUNTER — APPOINTMENT (OUTPATIENT)
Dept: GENERAL RADIOLOGY | Age: 55
End: 2025-03-14
Attending: PHYSICIAN ASSISTANT
Payer: COMMERCIAL

## 2025-03-14 ENCOUNTER — SPINE CENTER NAVIGATION (OUTPATIENT)
Age: 55
End: 2025-03-14

## 2025-03-14 ENCOUNTER — HOSPITAL ENCOUNTER (OUTPATIENT)
Age: 55
Discharge: HOME | End: 2025-03-14
Payer: COMMERCIAL

## 2025-03-14 VITALS
RESPIRATION RATE: 18 BRPM | SYSTOLIC BLOOD PRESSURE: 148 MMHG | BODY MASS INDEX: 26.22 KG/M2 | DIASTOLIC BLOOD PRESSURE: 84 MMHG | TEMPERATURE: 98 F | WEIGHT: 148 LBS | HEART RATE: 86 BPM | HEIGHT: 63 IN | OXYGEN SATURATION: 100 %

## 2025-03-14 DIAGNOSIS — M54.50 ACUTE BILATERAL LOW BACK PAIN, UNSPECIFIED WHETHER SCIATICA PRESENT: Primary | ICD-10-CM

## 2025-03-14 DIAGNOSIS — G89.29 CHRONIC PAIN OF LEFT KNEE: ICD-10-CM

## 2025-03-14 DIAGNOSIS — M25.562 CHRONIC PAIN OF LEFT KNEE: ICD-10-CM

## 2025-03-14 PROCEDURE — 99204 OFFICE O/P NEW MOD 45 MIN: CPT

## 2025-03-14 PROCEDURE — 99213 OFFICE O/P EST LOW 20 MIN: CPT

## 2025-03-14 PROCEDURE — 73560 X-RAY EXAM OF KNEE 1 OR 2: CPT | Performed by: PHYSICIAN ASSISTANT

## 2025-03-14 RX ORDER — CYCLOBENZAPRINE HCL 10 MG
10 TABLET ORAL NIGHTLY PRN
Qty: 15 TABLET | Refills: 0 | Status: SHIPPED | OUTPATIENT
Start: 2025-03-14

## 2025-03-14 RX ORDER — NAPROXEN 500 MG/1
500 TABLET ORAL 2 TIMES DAILY PRN
Qty: 20 TABLET | Refills: 0 | Status: SHIPPED | OUTPATIENT
Start: 2025-03-14

## 2025-03-14 NOTE — PROGRESS NOTES
Spine Center Referral Navigation Encounter Note    Referred by: Jonna Silvestre PA-C    Imaging: None   If imaging done at an external facility, instructed patient to bring disc of MRI to appointment.     Previously Seen Spine Care Providers: None    Referred to: Eric Pretty MD in Pain Management     Information below is patient reported.     [facility]  Are you currently experiencing any of the following symptoms?      No    Is your condition due to an injury that occurred at work or is your care for this condition being coordinated by Worker's Compensation?      No    Are you looking for a second surgical opinion?      No    Have you had surgery on your spine (neck or back) in the last 12 months?      No    In the last several weeks, have you experienced weakness or balance issues that have caused falls or difficulty lifting your legs or feet (lower body) and/or weakness that has caused you to drop items or caused changes in handwriting (upper body)?      No    In the last 3 months, have you had spine injections AND physical therapy for your back or neck that did not improve your symptoms?      No    : Patient needs to be seen by non-surgical team. Does patient require a new visit or established visit?      New patient visit    Are you closer to [place] or [facility]?      [facility]         3/14- [facility] for patient requesting call back to discuss.    3/14- Patient called back and was able to be scheduled for 3/24 at 3:45pm.

## 2025-03-14 NOTE — ED PROVIDER NOTES
Patient Seen in: [facility] [place] [facility]      History     Chief Complaint   Patient presents with    Back Pain    Leg or Foot Injury     Stated Complaint: Back Pain    Subjective:   HPI    55-year-old female with below stated past medical history presents to immediate care for evaluation of left medial knee pain after slip and fall on ice in December.  Landed directly on her knee.  Denies hitting her head or LOC, chest pain or SOB.    Secondly, patient complains of bilateral low back pain, left side low back pain worse than right. Back pain worse with certain movements.  She feels pain sometimes radiates down the left leg.  Denies injury. 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  Believes this is related.  Denies left lower extremity swelling, history of DVT/PE, exogenous estrogen use, recent surgery.      Objective:     Past Medical History:    Anemia    Calculus of kidney    Headache disorder    headaches    Lipid screening    Per [employer]    Nephrolithiasis    Sinusitis    Skin lesion    Vertigo    Visual impairment    readers              Past Surgical History:   Procedure Laterality Date    Exc skin benig 1.1-2cm remaindr body Right     Exc mass R neck    Skin surgery Right 2020    flank                Social History     Socioeconomic History    Marital status:    Tobacco Use    Smoking status: Former     Current packs/day: 0.00     Average packs/day: 0.5 packs/day for 15.0 years (7.5 ttl pk-yrs)     Types: Cigarettes     Start date: 10/22/2005     Quit date: 10/22/2020     Years since quittin.3    Smokeless tobacco: Never    Tobacco comments:     1 pack in a week   Vaping Use    Vaping status: Never Used   Substance and Sexual Activity    Alcohol use: No    Drug use: No    Sexual activity: Yes     Partners: Male   Other Topics Concern    Caffeine Concern Yes    Stress Concern No    Weight Concern No    Special Diet No    Exercise No    Seat Belt Yes    Left Handed No    Right Handed Yes     Currently spends a great deal of time in the sun No    History of tanning No    Hx of Spending Great Deal of Time in Sun No    Bad sunburns in the past Yes    Tanning Salons in the Past No    Hx of Radiation Treatments No              Review of Systems    Positive for stated complaint: Back Pain  Other systems are as noted in HPI.  Constitutional and vital signs reviewed.      All other systems reviewed and negative except as noted above.    Physical Exam     ED Triage Vitals [03/14/25 0852]   /84   Pulse 86   Resp 18   Temp 97.8 °F (36.6 °C)   Temp src Oral   SpO2 100 %   O2 Device None (Room air)       Current Vitals:   Vital Signs  BP: 148/84  Pulse: 86  Resp: 18  Temp: 97.8 °F (36.6 °C)  Temp src: Oral    Oxygen Therapy  SpO2: 100 %  O2 Device: None (Room air)        Physical Exam  Vitals and nursing note reviewed.   Constitutional:       General: She is not in acute distress.     Appearance: Normal appearance. She is not ill-appearing, toxic-appearing or diaphoretic.   Cardiovascular:      Rate and Rhythm: Normal rate.   Pulmonary:      Effort: Pulmonary effort is normal. No respiratory distress.   Musculoskeletal:      Lumbar back: Tenderness (left low back, no midline tenderness) present.      Left knee: No swelling. Normal range of motion. Tenderness (generalized tenderness to medial knee) present.   Neurological:      Mental Status: She is alert and oriented to person, place, and time.   Psychiatric:         Behavior: Behavior normal.         ED Course   Labs Reviewed - No data to display  XR KNEE (1 OR 2 VIEWS), LEFT (CPT=73560)    Result Date: 3/14/2025  PROCEDURE:  XR KNEE (1 OR 2 VIEWS), LEFT (CPT=73560)  COMPARISON:  None.  INDICATIONS:  Knee pain  PATIENT STATED HISTORY: (As transcribed by Technologist)  Left medial knee pain one month post fall.    FINDINGS:  Negative for fracture, dislocation, deformity or other acute bony abnormalities.  No soft tissue abnormalities.              CONCLUSION:  No acute fracture or other acute bony process.  LOCATION:  [facility]   Dictated by (CST): Lawrence Martinez MD on 3/14/2025 at 9:30 AM     Finalized by (CST): Lawrence Martinez MD on 3/14/2025 at 9:30 AM         I independently reviewed x-rays.  No acute osseous findings.    MDM      Differential diagnosis considered but not limited to joint effusion, ligamentous injury, less likely fracture, lumbar strain, radiculopathy    Patient endorses bilateral low back pain.  On exam, there is mechanical left low back pain.  No midline tenderness. No alarm symptoms at this time including bowel or bladder dysfunction, saddle anesthesia or fever. No hx of injury. XR not indicated.   Left knee x-ray negative for acute process.  Patient in agreement to continue follow-up with orthopedics for further evaluation/management of chronic knee pain.         Medical Decision Making  Amount and/or Complexity of Data Reviewed  Radiology: ordered and independent interpretation performed. Decision-making details documented in ED Course.    Risk  Prescription drug management.        Disposition and Plan     Clinical Impression:  1. Acute bilateral low back pain, unspecified whether sciatica present    2. Chronic pain of left knee         Disposition:  Discharge  3/14/2025  9:44 am    Follow-up:  Kenya Palma,   [address] [facility] [address]  [facility] [address] [phone]    Schedule an appointment as soon as possible for a visit       Houston Valenzuela PA-C  [address]  [facility] [address]  [facility] [id number] [phone]    Schedule an appointment as soon as possible for a visit             Medications Prescribed:  Current Discharge Medication List        START taking these medications    Details   cyclobenzaprine 10 MG Oral Tab Take 1 tablet (10 mg total) by mouth nightly as needed for Muscle spasms.  Qty: 15 tablet, Refills: 0      naproxen 500 MG Oral Tab Take 1 tablet (500 mg total) by mouth 2 (two) times daily as needed  (pain).  Qty: 20 tablet, Refills: 0                 Supplementary Documentation:

## 2025-03-14 NOTE — ED INITIAL ASSESSMENT (HPI)
Left lower back pain x 1 week, radiates down left leg. 7/10. Exacerbated by sitting and bending. No hx sciatica.    Slipped and fell on ice in late December, landing on left knee. Has pain at medial aspect of knee, swelling posterior.

## 2025-03-14 NOTE — DISCHARGE INSTRUCTIONS
Continue follow-up with spine center for back pain.     Continue follow-up with orthopedics Houston Valenzuela for knee pain.

## 2025-03-21 ENCOUNTER — TELEPHONE (OUTPATIENT)
Dept: INTERNAL MEDICINE CLINIC | Facility: CLINIC | Age: 55
End: 2025-03-21

## 2025-03-21 NOTE — TELEPHONE ENCOUNTER
Pt had a visit to the [facility] 3/14 and she is requesting an order for MRI of her back. She also did request to have her appt sooner if possible. Was able to accommodate.    Please advice pt regarding MRI    Future Appointments   Date Time Provider Department Center   4/9/2025  1:30 PM Kenya Palma,  [facility] [place]   7/16/2025 12:00 PM Madison Olivares MD [id number]

## 2025-08-13 ENCOUNTER — OFFICE VISIT (OUTPATIENT)
Dept: SURGERY | Facility: CLINIC | Age: 55
End: 2025-08-13

## 2025-08-13 DIAGNOSIS — M79.89 SOFT TISSUE MASS: Primary | ICD-10-CM

## 2025-08-13 PROCEDURE — 99203 OFFICE O/P NEW LOW 30 MIN: CPT | Performed by: SURGERY

## (undated) DEVICE — SUTURE MONOCRYL 4-0 PS-2

## (undated) DEVICE — SUTURE VICRYL 3-0 SH

## (undated) DEVICE — Device

## (undated) DEVICE — SOL  .9 1000ML BTL

## (undated) DEVICE — ACCUVAC SMOKE ATTACHMENT

## (undated) DEVICE — ENCORE® LATEX ACCLAIM SIZE 8, STERILE LATEX POWDER-FREE SURGICAL GLOVE: Brand: ENCORE

## (undated) DEVICE — CASED DISP BIPOLAR CORD

## (undated) DEVICE — MINOR GENERAL: Brand: MEDLINE INDUSTRIES, INC.

## (undated) DEVICE — TRAY SKIN PREP PVP-1

## (undated) DEVICE — CAUTERY BLADE 2IN INS E1455

## (undated) DEVICE — DRAPE SRG 50X36IN HD TRBN STRL

## (undated) DEVICE — SPONGE LAP 18X18 XRAY STRL

## (undated) DEVICE — ELECTRODE ESURG 2.75IN EZ CLN

## (undated) DEVICE — SUCTION CANISTER, 3000CC,SAFELINER: Brand: DEROYAL

## (undated) DEVICE — SYRINGE 10ML LL CONTRL SYRINGE

## (undated) DEVICE — STERILE TETRA-FLEX CF, ELASTIC BANDAGE, 4" X 5.5YD: Brand: TETRA-FLEX™CF

## (undated) DEVICE — TOWEL OR BLU 16X26 STRL

## (undated) DEVICE — DRAPE,UNDRBUT,WHT GRAD PCH,CAPPORT,20/CS: Brand: MEDLINE

## (undated) DEVICE — GAUZE SPONGES,12 PLY: Brand: CURITY

## (undated) DEVICE — STIRRUP STRAP W/SLING RING

## (undated) DEVICE — ENCORE® LATEX MICRO SIZE 8, STERILE LATEX POWDER-FREE SURGICAL GLOVE: Brand: ENCORE

## (undated) DEVICE — GAMMEX® PI HYBRID SIZE 6.5, STERILE POWDER-FREE SURGICAL GLOVE, POLYISOPRENE AND NEOPRENE BLEND: Brand: GAMMEX

## (undated) DEVICE — INTENDED FOR TISSUE SEPARATION, AND OTHER PROCEDURES THAT REQUIRE A SHARP SURGICAL BLADE TO PUNCTURE OR CUT.: Brand: BARD-PARKER ® STAINLESS STEEL BLADES

## (undated) DEVICE — DRAPE SRG 131X112X63IN GYN URO

## (undated) DEVICE — 9534HP TRANSPARENT DRSG W/FRAME: Brand: 3M™ TEGADERM™

## (undated) DEVICE — HEAD & NECK: Brand: MEDLINE INDUSTRIES, INC.

## (undated) DEVICE — DRAPE SHEET LG

## (undated) DEVICE — GOWN SURG AERO BLUE PERF LG

## (undated) DEVICE — 11.1-MULTIMODALITY IOM KIT

## (undated) DEVICE — BANDAGE ROLL,100% COTTON, 6 PLY, LARGE: Brand: KERLIX

## (undated) NOTE — LETTER
[facility]  [facility], [address] [place] [id number]  [facility]: [phone]  FAX: [phone] Hernández MD  [facility]  Via In Basket    The patient listed below is

## (undated) NOTE — LETTER
August 5, 2020    Xochitl Ricks, [address] [place] [address]     Patient: Kameron Wills   YOB: 1970   Date of Visit: 8/5/2020       Dear Dr. Arsenio Churchill MD:    Thank you for referring Kameron Wills to me for evaluation.  H • Ferrous Sulfate 325 (65 FE) MG Oral Tab Take by mouth.          Allergies:    Methylprednisolone          Comment:Other reaction(s): METHYLPREDNISOLONE    ROS:     ROS     Positive for: Eyes    Negative for: Constitutional, Gastrointestinal, Neurological, Sphere [facility] [place]    Right +0.75 +1.00 165 20/20     Left +0.50 +1.25 170 20/20     Type:  Distance only          Final Rx #2       Sphere [facility]yl[facility] [place]    Right +2.75 +1.00 165  20/20    Left +2.50 +1.25 170  20/20

## (undated) NOTE — Clinical Note
Hi Dr. Sharon Law saw your patient in the clinic today . Will complete work up for microhematuria. Thank you! Thank you,Samantha Oliver [facility]

## (undated) NOTE — MR AVS SNAPSHOT
After Visit Summary   11/20/2019    Cirilo Cabrera    MRN: [id number]           Visit Information     Date & Time  11/20/2019  2:00 PM Provider  Herber Villalobos, [address] [place] [address], [address] [place] [address],[address], [facility].  Ph Bishop Smith : Thank you for enrolling in [address] Please follow the instructions below to securely access your online medical record. [employer] allows you to send messages to your doctor, view test results, renew prescriptions and request appointments. If you receive a survey from [employer], please take a few minutes to complete it and provide feedback. We strive to deliver the best patient experience and are looking for ways to make improvements. Your feedback will help us do so.  For more information

## (undated) NOTE — LETTER
11/27/2019              Bere Billing        [id number] [facility] [id number]         Dear David Zendejas,      It was a pleasure to see you at our [address] [place] [address] office.   Your pap and hpv were teresa